# Patient Record
Sex: MALE | Race: WHITE | NOT HISPANIC OR LATINO | URBAN - METROPOLITAN AREA
[De-identification: names, ages, dates, MRNs, and addresses within clinical notes are randomized per-mention and may not be internally consistent; named-entity substitution may affect disease eponyms.]

---

## 2019-09-11 ENCOUNTER — INPATIENT (INPATIENT)
Facility: HOSPITAL | Age: 79
LOS: 7 days | Discharge: NURSING, REHAB OR LONG-TERM CARE | DRG: 698 | End: 2019-09-19
Attending: INTERNAL MEDICINE | Admitting: INTERNAL MEDICINE
Payer: MEDICARE

## 2019-09-11 VITALS
RESPIRATION RATE: 20 BRPM | DIASTOLIC BLOOD PRESSURE: 60 MMHG | OXYGEN SATURATION: 90 % | WEIGHT: 119.93 LBS | SYSTOLIC BLOOD PRESSURE: 98 MMHG | TEMPERATURE: 98 F | HEART RATE: 117 BPM

## 2019-09-11 DIAGNOSIS — A41.9 SEPSIS, UNSPECIFIED ORGANISM: ICD-10-CM

## 2019-09-11 LAB
ALBUMIN SERPL ELPH-MCNC: 3.2 G/DL — LOW (ref 3.3–5)
ALP SERPL-CCNC: 289 U/L — HIGH (ref 40–120)
ALT FLD-CCNC: 29 U/L — SIGNIFICANT CHANGE UP (ref 12–78)
ANION GAP SERPL CALC-SCNC: 9 MMOL/L — SIGNIFICANT CHANGE UP (ref 5–17)
APPEARANCE UR: ABNORMAL
APTT BLD: 33 SEC — SIGNIFICANT CHANGE UP (ref 28.5–37)
AST SERPL-CCNC: 19 U/L — SIGNIFICANT CHANGE UP (ref 15–37)
BASOPHILS # BLD AUTO: 0 K/UL — SIGNIFICANT CHANGE UP (ref 0–0.2)
BASOPHILS NFR BLD AUTO: 0 % — SIGNIFICANT CHANGE UP (ref 0–2)
BILIRUB SERPL-MCNC: 1.7 MG/DL — HIGH (ref 0.2–1.2)
BILIRUB UR-MCNC: ABNORMAL
BUN SERPL-MCNC: 32 MG/DL — HIGH (ref 7–23)
CALCIUM SERPL-MCNC: 8.7 MG/DL — SIGNIFICANT CHANGE UP (ref 8.5–10.1)
CHLORIDE SERPL-SCNC: 105 MMOL/L — SIGNIFICANT CHANGE UP (ref 96–108)
CO2 SERPL-SCNC: 28 MMOL/L — SIGNIFICANT CHANGE UP (ref 22–31)
COLOR SPEC: ABNORMAL
CREAT SERPL-MCNC: 1.1 MG/DL — SIGNIFICANT CHANGE UP (ref 0.5–1.3)
DIFF PNL FLD: ABNORMAL
EOSINOPHIL # BLD AUTO: 0 K/UL — SIGNIFICANT CHANGE UP (ref 0–0.5)
EOSINOPHIL NFR BLD AUTO: 0 % — SIGNIFICANT CHANGE UP (ref 0–6)
GLUCOSE SERPL-MCNC: 103 MG/DL — HIGH (ref 70–99)
GLUCOSE UR QL: NEGATIVE — SIGNIFICANT CHANGE UP
HCT VFR BLD CALC: 41.1 % — SIGNIFICANT CHANGE UP (ref 39–50)
HGB BLD-MCNC: 13.3 G/DL — SIGNIFICANT CHANGE UP (ref 13–17)
INR BLD: 1.51 RATIO — HIGH (ref 0.88–1.16)
KETONES UR-MCNC: ABNORMAL
LACTATE SERPL-SCNC: 4.1 MMOL/L — CRITICAL HIGH (ref 0.7–2)
LACTATE SERPL-SCNC: 7.4 MMOL/L — CRITICAL HIGH (ref 0.7–2)
LEUKOCYTE ESTERASE UR-ACNC: ABNORMAL
LYMPHOCYTES # BLD AUTO: 0.16 K/UL — LOW (ref 1–3.3)
LYMPHOCYTES # BLD AUTO: 7 % — LOW (ref 13–44)
MCHC RBC-ENTMCNC: 30.9 PG — SIGNIFICANT CHANGE UP (ref 27–34)
MCHC RBC-ENTMCNC: 32.4 GM/DL — SIGNIFICANT CHANGE UP (ref 32–36)
MCV RBC AUTO: 95.6 FL — SIGNIFICANT CHANGE UP (ref 80–100)
MONOCYTES # BLD AUTO: 0 K/UL — SIGNIFICANT CHANGE UP (ref 0–0.9)
MONOCYTES NFR BLD AUTO: 0 % — LOW (ref 2–14)
NEUTROPHILS # BLD AUTO: 2.12 K/UL — SIGNIFICANT CHANGE UP (ref 1.8–7.4)
NEUTROPHILS NFR BLD AUTO: 87 % — HIGH (ref 43–77)
NITRITE UR-MCNC: NEGATIVE — SIGNIFICANT CHANGE UP
NRBC # BLD: SIGNIFICANT CHANGE UP /100 WBCS (ref 0–0)
PH UR: 8 — SIGNIFICANT CHANGE UP (ref 5–8)
PLATELET # BLD AUTO: 181 K/UL — SIGNIFICANT CHANGE UP (ref 150–400)
POTASSIUM SERPL-MCNC: 4 MMOL/L — SIGNIFICANT CHANGE UP (ref 3.5–5.3)
POTASSIUM SERPL-SCNC: 4 MMOL/L — SIGNIFICANT CHANGE UP (ref 3.5–5.3)
PROT SERPL-MCNC: 6.3 G/DL — SIGNIFICANT CHANGE UP (ref 6–8.3)
PROT UR-MCNC: 150 MG/DL
PROTHROM AB SERPL-ACNC: 17.3 SEC — HIGH (ref 10–12.9)
RBC # BLD: 4.3 M/UL — SIGNIFICANT CHANGE UP (ref 4.2–5.8)
RBC # FLD: 13.2 % — SIGNIFICANT CHANGE UP (ref 10.3–14.5)
SODIUM SERPL-SCNC: 142 MMOL/L — SIGNIFICANT CHANGE UP (ref 135–145)
SP GR SPEC: 1.01 — SIGNIFICANT CHANGE UP (ref 1.01–1.02)
UROBILINOGEN FLD QL: 8
WBC # BLD: 2.28 K/UL — LOW (ref 3.8–10.5)
WBC # FLD AUTO: 2.28 K/UL — LOW (ref 3.8–10.5)

## 2019-09-11 PROCEDURE — 99291 CRITICAL CARE FIRST HOUR: CPT

## 2019-09-11 PROCEDURE — 74176 CT ABD & PELVIS W/O CONTRAST: CPT | Mod: 26

## 2019-09-11 PROCEDURE — 71045 X-RAY EXAM CHEST 1 VIEW: CPT | Mod: 26

## 2019-09-11 PROCEDURE — 70450 CT HEAD/BRAIN W/O DYE: CPT | Mod: 26

## 2019-09-11 PROCEDURE — 93010 ELECTROCARDIOGRAM REPORT: CPT

## 2019-09-11 PROCEDURE — 71250 CT THORAX DX C-: CPT | Mod: 26

## 2019-09-11 RX ORDER — SODIUM CHLORIDE 9 MG/ML
1000 INJECTION INTRAMUSCULAR; INTRAVENOUS; SUBCUTANEOUS ONCE
Refills: 0 | Status: COMPLETED | OUTPATIENT
Start: 2019-09-11 | End: 2019-09-11

## 2019-09-11 RX ORDER — PIPERACILLIN AND TAZOBACTAM 4; .5 G/20ML; G/20ML
3.38 INJECTION, POWDER, LYOPHILIZED, FOR SOLUTION INTRAVENOUS EVERY 8 HOURS
Refills: 0 | Status: DISCONTINUED | OUTPATIENT
Start: 2019-09-11 | End: 2019-09-19

## 2019-09-11 RX ORDER — VANCOMYCIN HCL 1 G
1000 VIAL (EA) INTRAVENOUS ONCE
Refills: 0 | Status: COMPLETED | OUTPATIENT
Start: 2019-09-11 | End: 2019-09-11

## 2019-09-11 RX ORDER — ACETAMINOPHEN 500 MG
975 TABLET ORAL ONCE
Refills: 0 | Status: COMPLETED | OUTPATIENT
Start: 2019-09-11 | End: 2019-09-11

## 2019-09-11 RX ORDER — SODIUM CHLORIDE 9 MG/ML
1000 INJECTION, SOLUTION INTRAVENOUS
Refills: 0 | Status: DISCONTINUED | OUTPATIENT
Start: 2019-09-11 | End: 2019-09-12

## 2019-09-11 RX ORDER — PIPERACILLIN AND TAZOBACTAM 4; .5 G/20ML; G/20ML
3.38 INJECTION, POWDER, LYOPHILIZED, FOR SOLUTION INTRAVENOUS ONCE
Refills: 0 | Status: COMPLETED | OUTPATIENT
Start: 2019-09-11 | End: 2019-09-11

## 2019-09-11 RX ORDER — ACETAMINOPHEN 500 MG
975 TABLET ORAL ONCE
Refills: 0 | Status: DISCONTINUED | OUTPATIENT
Start: 2019-09-11 | End: 2019-09-11

## 2019-09-11 RX ORDER — ACETAMINOPHEN 500 MG
650 TABLET ORAL EVERY 6 HOURS
Refills: 0 | Status: DISCONTINUED | OUTPATIENT
Start: 2019-09-11 | End: 2019-09-19

## 2019-09-11 RX ADMIN — SODIUM CHLORIDE 1000 MILLILITER(S): 9 INJECTION INTRAMUSCULAR; INTRAVENOUS; SUBCUTANEOUS at 20:40

## 2019-09-11 RX ADMIN — Medication 975 MILLIGRAM(S): at 20:20

## 2019-09-11 RX ADMIN — SODIUM CHLORIDE 1000 MILLILITER(S): 9 INJECTION INTRAMUSCULAR; INTRAVENOUS; SUBCUTANEOUS at 19:40

## 2019-09-11 RX ADMIN — PIPERACILLIN AND TAZOBACTAM 200 GRAM(S): 4; .5 INJECTION, POWDER, LYOPHILIZED, FOR SOLUTION INTRAVENOUS at 21:00

## 2019-09-11 RX ADMIN — SODIUM CHLORIDE 1000 MILLILITER(S): 9 INJECTION INTRAMUSCULAR; INTRAVENOUS; SUBCUTANEOUS at 22:59

## 2019-09-11 RX ADMIN — Medication 975 MILLIGRAM(S): at 19:50

## 2019-09-11 RX ADMIN — SODIUM CHLORIDE 1000 MILLILITER(S): 9 INJECTION INTRAMUSCULAR; INTRAVENOUS; SUBCUTANEOUS at 21:40

## 2019-09-11 RX ADMIN — PIPERACILLIN AND TAZOBACTAM 3.38 GRAM(S): 4; .5 INJECTION, POWDER, LYOPHILIZED, FOR SOLUTION INTRAVENOUS at 21:30

## 2019-09-11 RX ADMIN — Medication 250 MILLIGRAM(S): at 21:59

## 2019-09-11 RX ADMIN — SODIUM CHLORIDE 1000 MILLILITER(S): 9 INJECTION INTRAMUSCULAR; INTRAVENOUS; SUBCUTANEOUS at 21:59

## 2019-09-11 NOTE — ED ADULT NURSE NOTE - OBJECTIVE STATEMENT
Pt presents to the Ed via ambulance from [facility] s/p fever, hematuria in a leg bag. Pt is nonverbal and has a history of Parkinson disease. Pt placed on cardiac monitor. Pt has blood from the penis

## 2019-09-11 NOTE — ED PROVIDER NOTE - OBJECTIVE STATEMENT
79 yo male hx of bph, dementia, parkinsons, sent from [facility] for hematuria, found to have 106 rectal temp, patient nonverbal.  Unable to obtain further history from pain. No family at bedside. 79 yo male hx of bph, dementia, parkinsons, sent from [facility] for hematuria, found to have 106 rectal temp, patient nonverbal.  Unable to obtain further history from pain. No family at bedside.  Has MOLST is DNR/DNI

## 2019-09-11 NOTE — ED ADULT NURSE NOTE - PMH
Benign prostatic hyperplasia with lower urinary tract symptoms, symptom details unspecified    Dementia due to Parkinson's disease with behavioral disturbance    Hypertension, unspecified type    Other hyperlipidemia    Parkinson disease

## 2019-09-11 NOTE — ED ADULT NURSE NOTE - NSIMPLEMENTINTERV_GEN_ALL_ED
Implemented All Fall Risk Interventions:  [place] to call system. Call bell, personal items and telephone within reach. Instruct patient to call for assistance. Room bathroom lighting operational. Non-slip footwear when patient is off stretcher. Physically safe environment: no spills, clutter or unnecessary equipment. Stretcher in lowest position, wheels locked, appropriate side rails in place. Provide visual cue, wrist band, yellow gown, etc. Monitor gait and stability. Monitor for mental status changes and reorient to person, place, and time. Review medications for side effects contributing to fall risk. Reinforce activity limits and safety measures with patient and family.

## 2019-09-11 NOTE — ED PROVIDER NOTE - CRITICAL CARE PROVIDED
conducted a detailed discussion of DNR status/additional history taking/consultation with other physicians/direct patient care (not related to procedure)/interpretation of diagnostic studies

## 2019-09-11 NOTE — ED PROVIDER NOTE - CARE PLAN
Principal Discharge DX:	Sepsis, due to unspecified organism  Secondary Diagnosis:	Fever 106 degrees F or over

## 2019-09-11 NOTE — ED PROVIDER NOTE - PHYSICAL EXAMINATION
Gen: demented, parkinsonian features noted  Head/eyes: airway patent  ENT: mucous membranes dry, airway patent  Neck: supple, no tenderness/meningismus/JVD, Trachea midline  Pulm/lung: Bilateral clear BS, normal resp effort, no wheeze/stridor/retractions  CV/heart: +tachycardia, no M/R/G, +2 dist pulses (radial, pedal DP/PT, popliteal)  GI/Abd: soft, NT/ND, +BS, no guarding/rebound tenderness  Musculoskeletal: no edema/erythema/cyanosis, FROM in all extremities, no C/T/L spine ttp  Skin: +tactile fever  Neuro: demented moving all extremities

## 2019-09-12 DIAGNOSIS — G20 PARKINSON'S DISEASE: ICD-10-CM

## 2019-09-12 DIAGNOSIS — N40.1 BENIGN PROSTATIC HYPERPLASIA WITH LOWER URINARY TRACT SYMPTOMS: ICD-10-CM

## 2019-09-12 DIAGNOSIS — A41.9 SEPSIS, UNSPECIFIED ORGANISM: ICD-10-CM

## 2019-09-12 DIAGNOSIS — I10 ESSENTIAL (PRIMARY) HYPERTENSION: ICD-10-CM

## 2019-09-12 LAB
-  K. PNEUMONIAE GROUP: SIGNIFICANT CHANGE UP
ANION GAP SERPL CALC-SCNC: 10 MMOL/L — SIGNIFICANT CHANGE UP (ref 5–17)
BASOPHILS # BLD AUTO: 0 K/UL — SIGNIFICANT CHANGE UP (ref 0–0.2)
BASOPHILS NFR BLD AUTO: 0 % — SIGNIFICANT CHANGE UP (ref 0–2)
BUN SERPL-MCNC: 29 MG/DL — HIGH (ref 7–23)
CALCIUM SERPL-MCNC: 7.5 MG/DL — LOW (ref 8.5–10.1)
CHLORIDE SERPL-SCNC: 110 MMOL/L — HIGH (ref 96–108)
CO2 SERPL-SCNC: 24 MMOL/L — SIGNIFICANT CHANGE UP (ref 22–31)
CREAT SERPL-MCNC: 1 MG/DL — SIGNIFICANT CHANGE UP (ref 0.5–1.3)
ENTEROCOC DNA BLD POS QL NAA+NON-PROBE: SIGNIFICANT CHANGE UP
EOSINOPHIL # BLD AUTO: 0 K/UL — SIGNIFICANT CHANGE UP (ref 0–0.5)
EOSINOPHIL NFR BLD AUTO: 0 % — SIGNIFICANT CHANGE UP (ref 0–6)
GLUCOSE SERPL-MCNC: 83 MG/DL — SIGNIFICANT CHANGE UP (ref 70–99)
GRAM STN FLD: SIGNIFICANT CHANGE UP
HCT VFR BLD CALC: 31.6 % — LOW (ref 39–50)
HGB BLD-MCNC: 10.4 G/DL — LOW (ref 13–17)
LYMPHOCYTES # BLD AUTO: 0.66 K/UL — LOW (ref 1–3.3)
LYMPHOCYTES # BLD AUTO: 4 % — LOW (ref 13–44)
MANUAL SMEAR VERIFICATION: SIGNIFICANT CHANGE UP
MCHC RBC-ENTMCNC: 31.3 PG — SIGNIFICANT CHANGE UP (ref 27–34)
MCHC RBC-ENTMCNC: 32.9 GM/DL — SIGNIFICANT CHANGE UP (ref 32–36)
MCV RBC AUTO: 95.2 FL — SIGNIFICANT CHANGE UP (ref 80–100)
METHOD TYPE: SIGNIFICANT CHANGE UP
MICROCYTES BLD QL: SLIGHT — SIGNIFICANT CHANGE UP
MONOCYTES # BLD AUTO: 0.82 K/UL — SIGNIFICANT CHANGE UP (ref 0–0.9)
MONOCYTES NFR BLD AUTO: 5 % — SIGNIFICANT CHANGE UP (ref 2–14)
NEUTROPHILS # BLD AUTO: 14.94 K/UL — HIGH (ref 1.8–7.4)
NEUTROPHILS NFR BLD AUTO: 64 % — SIGNIFICANT CHANGE UP (ref 43–77)
NEUTS BAND # BLD: 27 % — HIGH (ref 0–8)
NRBC # BLD: 0 — SIGNIFICANT CHANGE UP
NRBC # BLD: SIGNIFICANT CHANGE UP /100 WBCS (ref 0–0)
PLAT MORPH BLD: NORMAL — SIGNIFICANT CHANGE UP
PLATELET # BLD AUTO: 103 K/UL — LOW (ref 150–400)
POIKILOCYTOSIS BLD QL AUTO: SLIGHT — SIGNIFICANT CHANGE UP
POTASSIUM SERPL-MCNC: 3.5 MMOL/L — SIGNIFICANT CHANGE UP (ref 3.5–5.3)
POTASSIUM SERPL-SCNC: 3.5 MMOL/L — SIGNIFICANT CHANGE UP (ref 3.5–5.3)
RBC # BLD: 3.32 M/UL — LOW (ref 4.2–5.8)
RBC # FLD: 13.3 % — SIGNIFICANT CHANGE UP (ref 10.3–14.5)
RBC BLD AUTO: SIGNIFICANT CHANGE UP
SODIUM SERPL-SCNC: 144 MMOL/L — SIGNIFICANT CHANGE UP (ref 135–145)
SPECIMEN SOURCE: SIGNIFICANT CHANGE UP
SPECIMEN SOURCE: SIGNIFICANT CHANGE UP
WBC # BLD: 16.42 K/UL — HIGH (ref 3.8–10.5)
WBC # FLD AUTO: 16.42 K/UL — HIGH (ref 3.8–10.5)

## 2019-09-12 RX ORDER — ENOXAPARIN SODIUM 100 MG/ML
40 INJECTION SUBCUTANEOUS DAILY
Refills: 0 | Status: DISCONTINUED | OUTPATIENT
Start: 2019-09-12 | End: 2019-09-19

## 2019-09-12 RX ORDER — IPRATROPIUM/ALBUTEROL SULFATE 18-103MCG
3 AEROSOL WITH ADAPTER (GRAM) INHALATION THREE TIMES A DAY
Refills: 0 | Status: DISCONTINUED | OUTPATIENT
Start: 2019-09-12 | End: 2019-09-18

## 2019-09-12 RX ORDER — CARBIDOPA AND LEVODOPA 25; 100 MG/1; MG/1
1 TABLET ORAL THREE TIMES A DAY
Refills: 0 | Status: DISCONTINUED | OUTPATIENT
Start: 2019-09-12 | End: 2019-09-19

## 2019-09-12 RX ORDER — SODIUM CHLORIDE 9 MG/ML
1000 INJECTION, SOLUTION INTRAVENOUS
Refills: 0 | Status: DISCONTINUED | OUTPATIENT
Start: 2019-09-12 | End: 2019-09-19

## 2019-09-12 RX ORDER — SODIUM CHLORIDE 9 MG/ML
750 INJECTION, SOLUTION INTRAVENOUS ONCE
Refills: 0 | Status: COMPLETED | OUTPATIENT
Start: 2019-09-12 | End: 2019-09-12

## 2019-09-12 RX ORDER — VANCOMYCIN HCL 1 G
1000 VIAL (EA) INTRAVENOUS EVERY 12 HOURS
Refills: 0 | Status: DISCONTINUED | OUTPATIENT
Start: 2019-09-12 | End: 2019-09-16

## 2019-09-12 RX ADMIN — SODIUM CHLORIDE 750 MILLILITER(S): 9 INJECTION, SOLUTION INTRAVENOUS at 02:24

## 2019-09-12 RX ADMIN — PIPERACILLIN AND TAZOBACTAM 25 GRAM(S): 4; .5 INJECTION, POWDER, LYOPHILIZED, FOR SOLUTION INTRAVENOUS at 05:30

## 2019-09-12 RX ADMIN — PIPERACILLIN AND TAZOBACTAM 25 GRAM(S): 4; .5 INJECTION, POWDER, LYOPHILIZED, FOR SOLUTION INTRAVENOUS at 21:08

## 2019-09-12 RX ADMIN — SODIUM CHLORIDE 80 MILLILITER(S): 9 INJECTION, SOLUTION INTRAVENOUS at 00:50

## 2019-09-12 RX ADMIN — PIPERACILLIN AND TAZOBACTAM 25 GRAM(S): 4; .5 INJECTION, POWDER, LYOPHILIZED, FOR SOLUTION INTRAVENOUS at 14:25

## 2019-09-12 RX ADMIN — Medication 1000 MILLIGRAM(S): at 00:30

## 2019-09-12 RX ADMIN — Medication 250 MILLIGRAM(S): at 18:09

## 2019-09-12 RX ADMIN — ENOXAPARIN SODIUM 40 MILLIGRAM(S): 100 INJECTION SUBCUTANEOUS at 18:09

## 2019-09-12 RX ADMIN — SODIUM CHLORIDE 80 MILLILITER(S): 9 INJECTION, SOLUTION INTRAVENOUS at 14:27

## 2019-09-12 NOTE — PROVIDER CONTACT NOTE (OTHER) - ASSESSMENT
Pt is non-verbal, poor historian, does not following command, breath sounds diminished to auscultation.  Place pt on tele monitoring and continuos pulse Ox.

## 2019-09-12 NOTE — PHYSICAL THERAPY INITIAL EVALUATION ADULT - SPECIFY REASON(S)
Pt is a LTC resident. Pt is unable to follow commands or participate with PT. Pt is not a candidate for PT.

## 2019-09-12 NOTE — H&P ADULT - HISTORY OF PRESENT ILLNESS
patient with parkinsons , dementia , bed ridden ,   with gross hematuria chronic floy , and developed fever , and and increased stiffness  patient has molst with dnr and dni and cmo but do abx if needed

## 2019-09-12 NOTE — H&P ADULT - NSICDXPASTMEDICALHX_GEN_ALL_CORE_FT
PAST MEDICAL HISTORY:  Benign prostatic hyperplasia with lower urinary tract symptoms, symptom details unspecified     Dementia due to Parkinson's disease with behavioral disturbance     Hypertension, unspecified type     Other hyperlipidemia     Parkinson disease

## 2019-09-12 NOTE — SWALLOW BEDSIDE ASSESSMENT ADULT - SWALLOW EVAL: DIAGNOSIS
1. The patient demonstrated a severe oral dysphagia for puree and honey thick liquids presented via tsp marked by significantly reduced oral aperture, reduced stripping of the bolus from the utensil, and absent bolus manipulation and posterior transport in the oral cavity despite maximum prompting. Bolus was observed to remain in the anterior sulcus despite patient eliciting a delayed pharyngeal swallow trigger with reduced hyolaryngeal elevation upon digital palpation. 2. Subsequently, the bolus was manually removed from the patient's oral cavity by this clinician to reduce risk of aspiration.

## 2019-09-12 NOTE — SWALLOW BEDSIDE ASSESSMENT ADULT - SWALLOW EVAL: VELAR ELEVATION
Unable to formally assess oral-motor function given patient's difficulty following low-level verbal/visual directives.

## 2019-09-12 NOTE — CONSULT NOTE ADULT - SUBJECTIVE AND OBJECTIVE BOX
77 yo male hx of bph, dementia, parkinson's non verbal DNR/I sent from SNF  for hematuria, found to have 106 rectal temp, lactate 7 slight elevation of bili and alk phos leukopenia  CT's were done and pending official read.    He received > 30cc/kg IVF  his BP has been ok for > 5 hrs here.  Lactate has decreased to 4.   Cultures sent.  Received vanco/zosyn.      CXR clear but CT shows L base infiltrate  ABD CT P  U/A suggestive of infection as the source here.    He looks scared but not uncomfortable.  His feet are warm, there is no mottling of the skin,  His abd is soft and no sign of tenderness       Patient does  not require ICU level of care at this time.        D/W Dr Garza  E-ICU 79 yo male hx of bph, dementia, parkinson's non verbal DNR/I sent from SNF  for hematuria, found to have 106 rectal temp, lactate 7 (septic shock by definition) slight elevation of bili and alk phos leukopenia  CT's were done and pending official read.    He received > 30cc/kg IVF  his BP has been ok for > 5 hrs here.  Lactate has decreased to 4.   Cultures sent.  Received vanco/zosyn.      CXR clear but CT shows L base infiltrate  ABD CT P  U/A suggestive of infection as the source here.    He looks scared but not uncomfortable.  His feet are warm, there is no mottling of the skin,  His abd is soft and no sign of tenderness       Patient does  not require ICU level of care at this time.    Please call if his blood pressure becomes an issue         D/W Dr Garza  E-ICU

## 2019-09-12 NOTE — PROGRESS NOTE ADULT - SUBJECTIVE AND OBJECTIVE BOX
PCP  Subjective:   in bed , awake , general stiffness    Objective:   Vital Signs Last 24 Hrs  T(C): 37 (19 @ 15:51), Max: 41.2 (19 @ 18:55)  T(F): 98.6 (19 @ 15:51), Max: 106.1 (19 @ 18:55)  HR: 86 (19 @ 16:24) (82 - 135)  BP: 97/56 (19 @ 15:51) (82/56 - 121/59)  BP(mean): --  RR: 16 (19 @ 16:24) (16 - 20)  SpO2: 92% (19 @ 16:24) (84% - 100%)  Daily     Daily Weight in k.1 (12 Sep 2019 02:07)    GENERAL:  wdwn male , awake , non verbal   EYES: open   NECK: general stiffness  CHEST/LUNG: clear  HEART: s1 s2 regular  ABDOMEN:  soft  EXTREMITIES:  no edema   SKIN:  warm   CNS:  advance parkinson's , stiffness, non verbal    Allergies: Allergies    No Known Allergies    Intolerances        Home Medications:  carbidopa-levodopa 10 mg-100 mg oral tablet: 1 tab(s) orally 4 times a day (11 Sep 2019 22:20)  finasteride 5 mg oral tablet: 1 tab(s) orally once a day (11 Sep 2019 22:20)  metoprolol succinate 25 mg oral tablet, extended release: 1 tab(s) orally once a day (11 Sep 2019 22:20)  simvastatin 40 mg oral tablet: 1 tab(s) orally once a day (at bedtime) (11 Sep 2019 22:20)  tamsulosin 0.4 mg oral capsule: 1 cap(s) orally once a day (11 Sep 2019 22:20)  tolterodine 4 mg oral capsule, extended release: 1 cap(s) orally once a day (11 Sep 2019 22:20)    Medications:   acetaminophen  Suppository .. 650 milliGRAM(s) Rectal every 6 hours PRN  ALBUTerol/ipratropium for Nebulization. 3 milliLiter(s) Nebulizer three times a day  carbidopa/levodopa  25/100 1 Tablet(s) Oral three times a day  enoxaparin Injectable 40 milliGRAM(s) SubCutaneous daily  piperacillin/tazobactam IVPB.. 3.375 Gram(s) IV Intermittent every 8 hours  sodium chloride 0.45%. 1000 milliLiter(s) IV Continuous <Continuous>  vancomycin  IVPB 1000 milliGRAM(s) IV Intermittent every 12 hours      LABS:                        10.4   16.42 )-----------( 103      ( 12 Sep 2019 06:53 )             31.6         144  |  110<H>  |  29<H>  ----------------------------<  83  3.5   |  24  |  1.00    Ca    7.5<L>      12 Sep 2019 06:53    TPro  6.3  /  Alb  3.2<L>  /  TBili  1.7<H>  /  DBili  x   /  AST  19  /  ALT  29  /  AlkPhos  289<H>      PT/INR - ( 11 Sep 2019 20:12 )   PT: 17.3 sec;   INR: 1.51 ratio         PTT - ( 11 Sep 2019 20:12 )  PTT:33.0 sec   @ 20:12  INR 1.51    Urinalysis Basic - ( 11 Sep 2019 20:43 )    Color: Red / Appearance: Turbid / S.010 / pH: x  Gluc: x / Ketone: Trace  / Bili: Small / Urobili: 8   Blood: x / Protein: 150 mg/dL / Nitrite: Negative   Leuk Esterase: Moderate / RBC: >50 /HPF / WBC 11-25   Sq Epi: x / Non Sq Epi: Occasional / Bacteria: Many        CAPILLARY BLOOD GLUCOSE              RECENT CULTURES:  Culture Results:   Growth in anaerobic bottle: Gram Positive Cocci in Pairs and Chains and  Gram Negative Rods  Growth in aerobic bottle: Gram Positive Cocci in Pairs and Chains and  Gram Negative Rods ( @ 00:19)  Culture Results:   Growth in aerobic bottle: Gram Positive Cocci in Pairs and Chains  Growth in anaerobic bottle: Gram Positive Cocci in Pairs and Chains and  Gram Negative Rods  "Due to technical problems, Proteus sp. will Not be reported as part of  the BCID panel until further notice" ***Blood Panel PCR results on this  specimen are available  approximately 3 hours after the Gram stain result.***  Gram stain, PCR, and/or culture results may not always  correspond due to difference in methodologies.  ************************************************************  This PCR assay was performed using [facility].  The following targets are tested for: Enterococcus,  vancomycin resistant enterococci, Listeria monocytogenes,  coagulase negative staphylococci, S. aureus,  methicillin resistant S. aureus, Streptococcus agalactiae  (Group B), S. pneumoniae, S. pyogenes (Group A),  Acinetobacter baumannii, Enterobacter cloacae, E. coli,  Klebsiella oxytoca, K. pneumoniae, Proteus sp.,  Serratia marcescens, Haemophilus influenzae,  Neisseria meningitidis, Pseudomonas aeruginosa, Candida  albicans, C. glabrata, C krusei, C parapsilosis,  C. tropicalis and the KPC resistance gene. ( @ 00:19)        Culture - Blood (collected 19 @ 00:19)  Source: .Blood Blood-Peripheral  Gram Stain (19 @ 14:35):    Growth in aerobic bottle: Gram Positive Cocci in Pairs and Chains    Growth in anaerobic bottle: Gram Positive Cocci in Pairs and Chains and    Gram Negative Rods  Preliminary Report (19 @ 14:36):    Growth in aerobic bottle: Gram Positive Cocci in Pairs and Chains    Growth in anaerobic bottle: Gram Positive Cocci in Pairs and Chains and    Gram Negative Rods    "Due to technical problems, Proteus sp. will Not be reported as part of    the BCID panel until further notice" ***Blood Panel PCR results on this    specimen are available    approximately 3 hours after the Gram stain result.***    Gram stain, PCR, and/or culture results may not always    correspond due to difference in methodologies.    ************************************************************    This PCR assay was performed using [facility].    The following targets are tested for: Enterococcus,    vancomycin resistant enterococci, Listeria monocytogenes,    coagulase negative staphylococci, S. aureus,    methicillin resistant S. aureus, Streptococcus agalactiae    (Group B), S. pneumoniae, S. pyogenes (Group A),    Acinetobacter baumannii, Enterobacter cloacae, E. coli,    Klebsiella oxytoca, K. pneumoniae, Proteus sp.,    Serratia marcescens, Haemophilus influenzae,    Neisseria meningitidis, Pseudomonas aeruginosa, Candida    albicans, C. glabrata, C krusei, C parapsilosis,    C. tropicalis and the KPC resistance gene.  Organism: Blood Culture PCR (19 @ 16:09)  Organism: Blood Culture PCR (19 @ 16:09)      -  Enterococcus species: Detec      -  Klebsiella pneumoniae: Detec      Method Type: PCR    Culture - Blood (collected 19 @ 00:19)  Source: .Blood Blood-Peripheral  Gram Stain (19 @ 16:03):    Growth in anaerobic bottle: Gram Positive Cocci in Pairs and Chains and    Gram Negative Rods    Growth in aerobic bottle: Gram Positive Cocci in Pairs and Chains and    Gram Negative Rods  Preliminary Report (19 @ 16:03):    Growth in anaerobic bottle: Gram Positive Cocci in Pairs and Chains and    Gram Negative Rods    Growth in aerobic bottle: Gram Positive Cocci in Pairs and Chains and    Gram Negative Rods

## 2019-09-13 LAB
-  AMIKACIN: SIGNIFICANT CHANGE UP
-  AMPICILLIN/SULBACTAM: SIGNIFICANT CHANGE UP
-  AMPICILLIN: SIGNIFICANT CHANGE UP
-  AZTREONAM: SIGNIFICANT CHANGE UP
-  CEFAZOLIN: SIGNIFICANT CHANGE UP
-  CEFEPIME: SIGNIFICANT CHANGE UP
-  CEFOXITIN: SIGNIFICANT CHANGE UP
-  CEFTRIAXONE: SIGNIFICANT CHANGE UP
-  CIPROFLOXACIN: SIGNIFICANT CHANGE UP
-  GENTAMICIN: SIGNIFICANT CHANGE UP
-  IMIPENEM: SIGNIFICANT CHANGE UP
-  LEVOFLOXACIN: SIGNIFICANT CHANGE UP
-  MEROPENEM: SIGNIFICANT CHANGE UP
-  NITROFURANTOIN: SIGNIFICANT CHANGE UP
-  PIPERACILLIN/TAZOBACTAM: SIGNIFICANT CHANGE UP
-  TIGECYCLINE: SIGNIFICANT CHANGE UP
-  TOBRAMYCIN: SIGNIFICANT CHANGE UP
-  TRIMETHOPRIM/SULFAMETHOXAZOLE: SIGNIFICANT CHANGE UP
ANION GAP SERPL CALC-SCNC: 7 MMOL/L — SIGNIFICANT CHANGE UP (ref 5–17)
BASOPHILS # BLD AUTO: 0 K/UL — SIGNIFICANT CHANGE UP (ref 0–0.2)
BASOPHILS NFR BLD AUTO: 0 % — SIGNIFICANT CHANGE UP (ref 0–2)
BUN SERPL-MCNC: 25 MG/DL — HIGH (ref 7–23)
CALCIUM SERPL-MCNC: 7.5 MG/DL — LOW (ref 8.5–10.1)
CHLORIDE SERPL-SCNC: 105 MMOL/L — SIGNIFICANT CHANGE UP (ref 96–108)
CO2 SERPL-SCNC: 27 MMOL/L — SIGNIFICANT CHANGE UP (ref 22–31)
CREAT SERPL-MCNC: 0.93 MG/DL — SIGNIFICANT CHANGE UP (ref 0.5–1.3)
CULTURE RESULTS: SIGNIFICANT CHANGE UP
EOSINOPHIL # BLD AUTO: 0 K/UL — SIGNIFICANT CHANGE UP (ref 0–0.5)
EOSINOPHIL NFR BLD AUTO: 0 % — SIGNIFICANT CHANGE UP (ref 0–6)
GLUCOSE SERPL-MCNC: 98 MG/DL — SIGNIFICANT CHANGE UP (ref 70–99)
HCT VFR BLD CALC: 29.9 % — LOW (ref 39–50)
HGB BLD-MCNC: 10 G/DL — LOW (ref 13–17)
LYMPHOCYTES # BLD AUTO: 0.96 K/UL — LOW (ref 1–3.3)
LYMPHOCYTES # BLD AUTO: 4 % — LOW (ref 13–44)
MANUAL SMEAR VERIFICATION: SIGNIFICANT CHANGE UP
MCHC RBC-ENTMCNC: 31.4 PG — SIGNIFICANT CHANGE UP (ref 27–34)
MCHC RBC-ENTMCNC: 33.4 GM/DL — SIGNIFICANT CHANGE UP (ref 32–36)
MCV RBC AUTO: 94 FL — SIGNIFICANT CHANGE UP (ref 80–100)
METHOD TYPE: SIGNIFICANT CHANGE UP
MONOCYTES # BLD AUTO: 0.48 K/UL — SIGNIFICANT CHANGE UP (ref 0–0.9)
MONOCYTES NFR BLD AUTO: 2 % — SIGNIFICANT CHANGE UP (ref 2–14)
NEUTROPHILS # BLD AUTO: 22.31 K/UL — HIGH (ref 1.8–7.4)
NEUTROPHILS NFR BLD AUTO: 81 % — HIGH (ref 43–77)
NEUTS BAND # BLD: 12 % — HIGH (ref 0–8)
NRBC # BLD: 0 — SIGNIFICANT CHANGE UP
NRBC # BLD: SIGNIFICANT CHANGE UP /100 WBCS (ref 0–0)
ORGANISM # SPEC MICROSCOPIC CNT: SIGNIFICANT CHANGE UP
ORGANISM # SPEC MICROSCOPIC CNT: SIGNIFICANT CHANGE UP
PLAT MORPH BLD: NORMAL — SIGNIFICANT CHANGE UP
PLATELET # BLD AUTO: 102 K/UL — LOW (ref 150–400)
POTASSIUM SERPL-MCNC: 3.1 MMOL/L — LOW (ref 3.5–5.3)
POTASSIUM SERPL-SCNC: 3.1 MMOL/L — LOW (ref 3.5–5.3)
RBC # BLD: 3.18 M/UL — LOW (ref 4.2–5.8)
RBC # FLD: 13.2 % — SIGNIFICANT CHANGE UP (ref 10.3–14.5)
RBC BLD AUTO: SIGNIFICANT CHANGE UP
SODIUM SERPL-SCNC: 139 MMOL/L — SIGNIFICANT CHANGE UP (ref 135–145)
SPECIMEN SOURCE: SIGNIFICANT CHANGE UP
VANCOMYCIN TROUGH SERPL-MCNC: 15 UG/ML — SIGNIFICANT CHANGE UP (ref 10–20)
VARIANT LYMPHS # BLD: 1 % — SIGNIFICANT CHANGE UP (ref 0–6)
WBC # BLD: 23.99 K/UL — HIGH (ref 3.8–10.5)
WBC # FLD AUTO: 23.99 K/UL — HIGH (ref 3.8–10.5)

## 2019-09-13 RX ORDER — IPRATROPIUM/ALBUTEROL SULFATE 18-103MCG
3 AEROSOL WITH ADAPTER (GRAM) INHALATION ONCE
Refills: 0 | Status: COMPLETED | OUTPATIENT
Start: 2019-09-13 | End: 2019-09-13

## 2019-09-13 RX ADMIN — SODIUM CHLORIDE 50 MILLILITER(S): 9 INJECTION, SOLUTION INTRAVENOUS at 05:06

## 2019-09-13 RX ADMIN — Medication 3 MILLILITER(S): at 07:42

## 2019-09-13 RX ADMIN — ENOXAPARIN SODIUM 40 MILLIGRAM(S): 100 INJECTION SUBCUTANEOUS at 12:24

## 2019-09-13 RX ADMIN — Medication 3 MILLILITER(S): at 19:11

## 2019-09-13 RX ADMIN — Medication 3 MILLILITER(S): at 01:05

## 2019-09-13 RX ADMIN — Medication 3 MILLILITER(S): at 13:12

## 2019-09-13 RX ADMIN — PIPERACILLIN AND TAZOBACTAM 25 GRAM(S): 4; .5 INJECTION, POWDER, LYOPHILIZED, FOR SOLUTION INTRAVENOUS at 05:06

## 2019-09-13 RX ADMIN — PIPERACILLIN AND TAZOBACTAM 25 GRAM(S): 4; .5 INJECTION, POWDER, LYOPHILIZED, FOR SOLUTION INTRAVENOUS at 22:01

## 2019-09-13 RX ADMIN — Medication 250 MILLIGRAM(S): at 19:05

## 2019-09-13 RX ADMIN — Medication 250 MILLIGRAM(S): at 05:06

## 2019-09-13 RX ADMIN — PIPERACILLIN AND TAZOBACTAM 25 GRAM(S): 4; .5 INJECTION, POWDER, LYOPHILIZED, FOR SOLUTION INTRAVENOUS at 15:03

## 2019-09-13 NOTE — GOALS OF CARE CONVERSATION - PERSONAL ADVANCE DIRECTIVE - CONVERSATION DETAILS
spoke to pt sonArmando on phone, reviewed molst form: pt is dnr dni no TF, comfort measures, antibiotics. Dr Chavez updated molst form.  further discussion of plan of care for pt, wants IV antibiotics, aware need for labs drawn while on antibiotics, concerns regarding pt edmondson catheter & cause of infections, pt had edmondson out, had not voided, need for edmondson again.  further plan, inquired if spoke of hospice for pt, son will talk to soc work when visits, pt not eating, pt may meet guidelines for hospice at this time,(pt had been at [facility] & did not think pt could receive hospice at that time.) pt has been at  since 7/19.  PC will follow.

## 2019-09-13 NOTE — PROGRESS NOTE ADULT - SUBJECTIVE AND OBJECTIVE BOX
Neurology follow up note    KAITLIN [id number]      Interval History:    Patient feels ok no new complaints.    MEDICATIONS    acetaminophen  Suppository .. 650 milliGRAM(s) Rectal every 6 hours PRN  ALBUTerol/ipratropium for Nebulization. 3 milliLiter(s) Nebulizer three times a day  carbidopa/levodopa  25/100 1 Tablet(s) Oral three times a day  enoxaparin Injectable 40 milliGRAM(s) SubCutaneous daily  piperacillin/tazobactam IVPB.. 3.375 Gram(s) IV Intermittent every 8 hours  sodium chloride 0.45%. 1000 milliLiter(s) IV Continuous <Continuous>  vancomycin  IVPB 1000 milliGRAM(s) IV Intermittent every 12 hours      Allergies    No Known Allergies    Intolerances            Vital Signs Last 24 Hrs  T(C): 36.7 (13 Sep 2019 08:00), Max: 37 (12 Sep 2019 15:51)  T(F): 98.1 (13 Sep 2019 08:00), Max: 98.6 (12 Sep 2019 15:51)  HR: 75 (13 Sep 2019 08:00) (75 - 91)  BP: 105/58 (13 Sep 2019 08:00) (97/56 - 109/63)  BP(mean): --  RR: 16 (13 Sep 2019 08:00) (16 - 18)  SpO2: 100% (13 Sep 2019 08:00) (84% - 100%)    REVIEW OF SYSTEMS:  Could not be obtained from the patient secondary to the patient being nonverbal.  Presents to the hospital secondary to history of fevers.    PHYSICAL EXAMINATION:     HEENT:  Head:  Normocephalic and atraumatic.    Eyes:  No scleral icterus.    Ears:  Hard to evaluate secondary to the patient is nonverbal.    NECK:  He had increased tone.    CARDIOVASCULAR:  S1 and S2 heard.    RESPIRATORY:  Decreased breath sounds bilaterally, but gives poor effort.    ABDOMEN:  Soft and nontender.    EXTREMITIES:  No clubbing or cyanosis were noted.      NEUROLOGIC:  The patient was awake in bed.  Positive on and off blink to visual threat.  The patient will not follow any commands.  Unable to gaze extraocular movements, but positive blink to bilateral visual threat.    The patient has positive masked face.    The patient is nonverbal.    Motor:  Elevated bilateral upper extremities.  The patient was able to maintain elevated position, so would say 4/5.    Positive resting tremors were noted.    Positive cogwheel rigidity was noted.    Positive bradykinesia was noted in the upper extremities.    Bilateral lower extremities had significant tone.  Slight flexation at the hip and knee.  As per my conversation with son, it appears that the patient is mostly wheelchair bound.              LABS:  CBC Full  -  ( 13 Sep 2019 07:42 )  WBC Count : 23.99 K/uL  RBC Count : 3.18 M/uL  Hemoglobin : 10.0 g/dL  Hematocrit : 29.9 %  Platelet Count - Automated : 102 K/uL  Mean Cell Volume : 94.0 fl  Mean Cell Hemoglobin : 31.4 pg  Mean Cell Hemoglobin Concentration : 33.4 gm/dL  Auto Neutrophil # : 22.31 K/uL  Auto Lymphocyte # : 0.96 K/uL  Auto Monocyte # : 0.48 K/uL  Auto Eosinophil # : 0.00 K/uL  Auto Basophil # : 0.00 K/uL  Auto Neutrophil % : 81.0 %  Auto Lymphocyte % : 4.0 %  Auto Monocyte % : 2.0 %  Auto Eosinophil % : 0.0 %  Auto Basophil % : 0.0 %    Urinalysis Basic - ( 11 Sep 2019 20:43 )    Color: Red / Appearance: Turbid / S.010 / pH: x  Gluc: x / Ketone: Trace  / Bili: Small / Urobili: 8   Blood: x / Protein: 150 mg/dL / Nitrite: Negative   Leuk Esterase: Moderate / RBC: >50 /HPF / WBC    Sq Epi: x / Non Sq Epi: Occasional / Bacteria: Many          139  |  105  |  25<H>  ----------------------------<  98  3.1<L>   |  27  |  0.93    Ca    7.5<L>      13 Sep 2019 07:42    TPro  6.3  /  Alb  3.2<L>  /  TBili  1.7<H>  /  DBili  x   /  AST  19  /  ALT  29  /  AlkPhos  289<H>      Hemoglobin A1C:     LIVER FUNCTIONS - ( 11 Sep 2019 20:12 )  Alb: 3.2 g/dL / Pro: 6.3 g/dL / ALK PHOS: 289 U/L / ALT: 29 U/L / AST: 19 U/L / GGT: x           Vitamin B12   PT/INR - ( 11 Sep 2019 20:12 )   PT: 17.3 sec;   INR: 1.51 ratio         PTT - ( 11 Sep 2019 20:12 )  PTT:33.0 sec      RADIOLOGY      ANALYSIS AND PLAN:  A 78-year-old with history of dementia and Parkinson's.  1.	For history of dementia, most likely this is very advanced.  I do not feel that adding medications will be of any benefit for the patient.  2.	For history of underlying Parkinson's, suspect the patient does have Parkinson's exacerbation, which is multifactorial secondary to underlying infectious type process and dehydration.  For now, I will continue the patient on his current dose of Sinemet.  Once the patient's general medical issues are rectified, if the patient still appears to have significant symptoms of Parkinson's, I would recommend consideration of increasing the patient's Parkinson's medications to 37.5 mg three times a day and adjust accordingly.  3.	I would recommend fall precautions.  4.	Physical therapy evaluation.  5.	Monitor oral intake.  6.	I spoke with son, Armando at [phone].  He understands my reasoning and thought process.  7.	Greater than 45 minutes of time was spent on the patient, plan of care, reviewing data, speaking to family and multidisciplinary healthcare team.    Thank you for the courtesy of consultation.    Physical therapy evaluation as tolerated  OOB to chair/ambulation with assistance only if possible.

## 2019-09-13 NOTE — CHART NOTE - NSCHARTNOTEFT_GEN_A_CORE
Called by RN for o2 desat. Pt seen and examined at bedside, nonverbal with hx of advanced Parkinson, dementia admitted for sepsis 2/2 uti and pna. Frail elderly with very poor respiratory drive, nonverbal baseline, decreased bs b/l bases. Impression: Acute hypoxic respiratory failure 2/2 pna with copious secretions and concurrent comorbidities of parkinson's and baseline malnutrition likely contributing to his poor respiratory drive.  Start nonrebreather mask with 100% fio2, titrate down as possible to venti mask, maintain o2 sat > 88%. Stat dose of duoneb treatment. Continue suction as needed.  If respiratory function continues to worsen, pt will need bipap but given extensive secretions is a poor candidate.  At high risk for acute decompensation, pt is DNR/DNI, recommend pursuing comfort measures. Called by RN for o2 desat. Pt seen and examined at bedside, nonverbal with hx of advanced Parkinson, dementia admitted for sepsis 2/2 uti and pna. Frail elderly with very poor respiratory drive, nonverbal baseline, decreased bs b/l bases. Labs/vitals/notes reviewed. Impression: Acute hypoxic respiratory failure 2/2 pna with copious secretions and concurrent comorbidities of parkinson's and baseline malnutrition likely contributing to his poor respiratory drive.  Start nonrebreather mask with 100% fio2, titrate down as possible to venti mask, maintain o2 sat > 88%. Stat dose of duoneb treatment. Continue suction as needed.  If respiratory function continues to worsen, pt will need bipap but given extensive secretions is a poor candidate.  At high risk for acute decompensation, pt is DNR/DNI, recommend pursuing comfort measures. Called by RN for o2 desat. Pt seen and examined at bedside, nonverbal with hx of advanced Parkinson and dementia admitted for sepsis 2/2 uti and pna. Frail elderly with very poor respiratory drive, nonverbal baseline, decreased bs b/l bases. Labs/vitals/notes reviewed. Impression: Acute hypoxic respiratory failure 2/2 pna with copious secretions and concurrent comorbidities of parkinson's and baseline malnutrition likely contributing to his poor respiratory drive.  Start nonrebreather mask with 100% fio2, titrate down as possible to venti mask, maintain o2 sat > 88%. Stat dose of duoneb treatment. Continue suction as needed.  If respiratory function continues to worsen, pt will need bipap but given extensive secretions is a poor candidate.  At high risk for acute decompensation, pt is DNR/DNI, recommend pursuing comfort measures. Called by RN for o2 desat. Pt seen and examined at bedside, nonverbal with hx of advanced Parkinson and dementia admitted for sepsis 2/2 uti and pna. Frail elderly with very poor respiratory drive, nonverbal baseline, decreased bs b/l bases. Labs/vitals/notes reviewed. Impression: Acute hypoxic respiratory failure 2/2 pna with copious secretions and concurrent comorbidities of parkinson's and baseline malnutrition likely contributing to his poor respiratory drive.  Start nonrebreather mask with 100% fio2, titrate down as possible to venti mask, maintain o2 sat > 88%. Stat dose of duoneb treatment. Continue suction as needed.  If respiratory function continues to worsen, pt will need bipap but given extensive secretions is a poor candidate.  At high risk for acute decompensation, pt is DNR/DNI.

## 2019-09-13 NOTE — PROGRESS NOTE ADULT - SUBJECTIVE AND OBJECTIVE BOX
PCP  Subjective:   in bed , asleep , lethargic ,     Objective:   Vital Signs Last 24 Hrs  T(C): 36.7 (19 @ 08:00), Max: 37 (19 @ 15:51)  T(F): 98.1 (19 @ 08:00), Max: 98.6 (19 @ 15:51)  HR: 75 (19 @ 08:00) (75 - 91)  BP: 105/58 (19 @ 08:00) (97/56 - 109/63)  BP(mean): --  RR: 16 (19 @ 08:00) (16 - 18)  SpO2: 100% (19 @ 08:00) (84% - 100%)  Daily     Daily       GENERAL:  wdwn male , awake , non verbal   EYES: open   NECK: general stiffness  CHEST/LUNG: clear  HEART: s1 s2 regular  ABDOMEN:  soft  EXTREMITIES:  no edema   SKIN:  warm   CNS:  advance parkinson's , stiffness, non verbal    Allergies: Allergies    No Known Allergies    Intolerances        Home Medications:  carbidopa-levodopa 10 mg-100 mg oral tablet: 1 tab(s) orally 4 times a day (11 Sep 2019 22:20)  finasteride 5 mg oral tablet: 1 tab(s) orally once a day (11 Sep 2019 22:20)  metoprolol succinate 25 mg oral tablet, extended release: 1 tab(s) orally once a day (11 Sep 2019 22:20)  simvastatin 40 mg oral tablet: 1 tab(s) orally once a day (at bedtime) (11 Sep 2019 22:20)  tamsulosin 0.4 mg oral capsule: 1 cap(s) orally once a day (11 Sep 2019 22:20)  tolterodine 4 mg oral capsule, extended release: 1 cap(s) orally once a day (11 Sep 2019 22:20)    Medications:   acetaminophen  Suppository .. 650 milliGRAM(s) Rectal every 6 hours PRN  ALBUTerol/ipratropium for Nebulization. 3 milliLiter(s) Nebulizer three times a day  carbidopa/levodopa  25/100 1 Tablet(s) Oral three times a day  enoxaparin Injectable 40 milliGRAM(s) SubCutaneous daily  piperacillin/tazobactam IVPB.. 3.375 Gram(s) IV Intermittent every 8 hours  sodium chloride 0.45%. 1000 milliLiter(s) IV Continuous <Continuous>  vancomycin  IVPB 1000 milliGRAM(s) IV Intermittent every 12 hours      LABS:                        10.0   23.99 )-----------( 102      ( 13 Sep 2019 07:42 )             29.9         139  |  105  |  25<H>  ----------------------------<  98  3.1<L>   |  27  |  0.93    Ca    7.5<L>      13 Sep 2019 07:42    TPro  6.3  /  Alb  3.2<L>  /  TBili  1.7<H>  /  DBili  x   /  AST  19  /  ALT  29  /  AlkPhos  289<H>      PT/INR - ( 11 Sep 2019 20:12 )   PT: 17.3 sec;   INR: 1.51 ratio         PTT - ( 11 Sep 2019 20:12 )  PTT:33.0 sec   @ 20:12  INR 1.51    Urinalysis Basic - ( 11 Sep 2019 20:43 )    Color: Red / Appearance: Turbid / S.010 / pH: x  Gluc: x / Ketone: Trace  / Bili: Small / Urobili: 8   Blood: x / Protein: 150 mg/dL / Nitrite: Negative   Leuk Esterase: Moderate / RBC: >50 /HPF / WBC 11-25   Sq Epi: x / Non Sq Epi: Occasional / Bacteria: Many        CAPILLARY BLOOD GLUCOSE              RECENT CULTURES:  Culture Results:   Growth in anaerobic bottle: Gram Positive Cocci in Pairs and Chains and  Gram Negative Rods  Growth in aerobic bottle: Gram Positive Cocci in Pairs and Chains and  Gram Negative Rods ( @ 00:19)  Culture Results:   Growth in aerobic bottle: Gram Positive Cocci in Pairs and Chains  Growth in anaerobic bottle: Gram Positive Cocci in Pairs and Chains and  Gram Negative Rods  "Due to technical problems, Proteus sp. will Not be reported as part of  the BCID panel until further notice" ***Blood Panel PCR results on this  specimen are available  approximately 3 hours after the Gram stain result.***  Gram stain, PCR, and/or culture results may not always  correspond due to difference in methodologies.  ************************************************************  This PCR assay was performed using [email or web address].  The following targets are tested for: Enterococcus,  vancomycin resistant enterococci, Listeria monocytogenes,  coagulase negative staphylococci, S. aureus,  methicillin resistant S. aureus, Streptococcus agalactiae  (Group B), S. pneumoniae, S. pyogenes (Group A),  Acinetobacter baumannii, Enterobacter cloacae, E. coli,  Klebsiella oxytoca, K. pneumoniae, Proteus sp.,  Serratia marcescens, Haemophilus influenzae,  Neisseria meningitidis, Pseudomonas aeruginosa, Candida  albicans, C. glabrata, C krusei, C parapsilosis,  C. tropicalis and the KPC resistance gene. ( @ 00:19)  Culture Results:   >100,000 CFU/ml Gram Negative Rods ( @ 00:10)        Culture - Blood (collected 19 @ 00:19)  Source: .Blood Blood-Peripheral  Gram Stain (19 @ 14:35):    Growth in aerobic bottle: Gram Positive Cocci in Pairs and Chains    Growth in anaerobic bottle: Gram Positive Cocci in Pairs and Chains and    Gram Negative Rods  Preliminary Report (19 @ 14:36):    Growth in aerobic bottle: Gram Positive Cocci in Pairs and Chains    Growth in anaerobic bottle: Gram Positive Cocci in Pairs and Chains and    Gram Negative Rods    "Due to technical problems, Proteus sp. will Not be reported as part of    the BCID panel until further notice" ***Blood Panel PCR results on this    specimen are available    approximately 3 hours after the Gram stain result.***    Gram stain, PCR, and/or culture results may not always    correspond due to difference in methodologies.    ************************************************************    This PCR assay was performed using [email or web address].    The following targets are tested for: Enterococcus,    vancomycin resistant enterococci, Listeria monocytogenes,    coagulase negative staphylococci, S. aureus,    methicillin resistant S. aureus, Streptococcus agalactiae    (Group B), S. pneumoniae, S. pyogenes (Group A),    Acinetobacter baumannii, Enterobacter cloacae, E. coli,    Klebsiella oxytoca, K. pneumoniae, Proteus sp.,    Serratia marcescens, Haemophilus influenzae,    Neisseria meningitidis, Pseudomonas aeruginosa, Candida    albicans, C. glabrata, C krusei, C parapsilosis,    C. tropicalis and the KPC resistance gene.  Organism: Blood Culture PCR (19 @ 16:09)  Organism: Blood Culture PCR (19 @ 16:09)      -  Enterococcus species: Detec      -  Klebsiella pneumoniae: Detec      Method Type: PCR    Culture - Blood (collected 19 @ 00:19)  Source: .Blood Blood-Peripheral  Gram Stain (19 @ 16:03):    Growth in anaerobic bottle: Gram Positive Cocci in Pairs and Chains and    Gram Negative Rods    Growth in aerobic bottle: Gram Positive Cocci in Pairs and Chains and    Gram Negative Rods  Preliminary Report (19 @ 16:03):    Growth in anaerobic bottle: Gram Positive Cocci in Pairs and Chains and    Gram Negative Rods    Growth in aerobic bottle: Gram Positive Cocci in Pairs and Chains and    Gram Negative Rods    Culture - Urine (collected 19 @ 00:10)  Source: .Urine Clean Catch (Midstream)  Preliminary Report (19 @ 00:52):    >100,000 CFU/ml Gram Negative Rods

## 2019-09-13 NOTE — CONSULT NOTE ADULT - SUBJECTIVE AND OBJECTIVE BOX
Full note to follow   Patient a nonhistorian, does not respond to questions or commands  Polymicrobial septicemia  Likely urinary origin  Continue Vanco Zosyn  Dose of Vanco generous, await pending trough  Hopefully will be able to narrow coverage  F/U repeat blood cx  Await sensi of isolates  Thank you for the courtesy of this referral.  Jv Sorensen MD  [phone]  ------------------------------------  [facility], Division of Infectious Diseases  Bettina Sorensen, CHADWICK Hernández, AUGUSTUS HERRERACHAKA, KAITLIN  78y, Male  [id number]    HPI--      PMH/PSH--  Benign prostatic hyperplasia with lower urinary tract symptoms, symptom details unspecified  Other hyperlipidemia  Hypertension, unspecified type  Dementia due to Parkinson's disease with behavioral disturbance  Parkinson disease      Allergies--      Medications--  Antibiotics: piperacillin/tazobactam IVPB.. 3.375 Gram(s) IV Intermittent every 8 hours  vancomycin  IVPB 1000 milliGRAM(s) IV Intermittent every 12 hours    Immunologic:   Other: acetaminophen  Suppository .. PRN  ALBUTerol/ipratropium for Nebulization.  carbidopa/levodopa  25/100  enoxaparin Injectable  sodium chloride 0.45%.      Social History--  EtOH: denies ***  Tobacco: denies ***  Drug Use: denies ***    Family/Marital History--    Remainder not relevant to clinical concern.    Travel/Environmental/Occupational History:  *** insert T/E/O Hx ***    Review of Systems:  A >=10-point review of systems was obtained.     Pertinent positives and negatives--  Constitutional: No fevers. No Chills. No Rigors.   Eyes:  ENMT:  Cardiovascular: No chest pain. No palpitations.  Respiratory: No shortness of breath. No cough.  Gastrointestinal: No nausea or vomiting. No diarrhea or constipation.   Genitourinary:  Musculoskeletal:  Skin:  Neurologic:  Psychiatric: Pleasant. Appropriate affect.  Endocrine:  Heme/Lymphatic:  Allergy/Immunologic:    Review of systems otherwise negative except as previously noted.    Physical Exam--  Vital Signs: T(F): 97.7 (09-13-19 @ 12:00), Max: 98.6 (09-13-19 @ 04:40)  HR: 75 (09-13-19 @ 13:22)  BP: 110/66 (09-13-19 @ 12:00)  RR: 16 (09-13-19 @ 12:00)  SpO2: 99% (09-13-19 @ 12:00)  Wt(kg): --  General: Nontoxic-appearing Male in no acute distress.  HEENT: AT/NC. PERRL. EOMI. Anicteric. Conjunctiva pink and moist. Oropharynx clear. Dentition fair.  Neck: Not rigid. No sense of mass.  Nodes: None palpable.  Lungs: Clear bilaterally without rales, wheezing or rhonchi  Heart: Regular rate and rhythm. No Murmur. No rub. No gallop. No palpable thrill.  Abdomen: Bowel sounds present and normoactive. Soft. Nondistended. Nontender. No sense of mass. No organomegaly.  Back: No spinal tenderness. No costovertebral angle tenderness.   Extremities: No cyanosis or clubbing. No edema.   Skin: Warm. Dry. Good turgor. No rash. No vasculitic stigmata.  Psychiatric: Appropriate affect and mood for situation.         Laboratory & Imaging Data--  CBC                        10.0   23.99 )-----------( 102      ( 13 Sep 2019 07:42 )             29.9       Chemistries  09-13    139  |  105  |  25<H>  ----------------------------<  98  3.1<L>   |  27  |  0.93    Ca    7.5<L>      13 Sep 2019 07:42    TPro  6.3  /  Alb  3.2<L>  /  TBili  1.7<H>  /  DBili  x   /  AST  19  /  ALT  29  /  AlkPhos  289<H>  09-11    Urinalysis (09.11.19 @ 20:43)    Glucose Qualitative, Urine: Negative    Blood, Urine: Large    pH Urine: 8.0    Color: Red    Urine Appearance: Turbid    Bilirubin: Small    Ketone - Urine: Trace    Specific Gravity: 1.010    Protein, Urine: 150 mg/dL    Urobilinogen: 8    Nitrite: Negative    Leukocyte Esterase Concentration: Moderate  Urine Microscopic-Add On (NC) (09.11.19 @ 20:43)    Red Blood Cell - Urine: >50 /HPF    White Blood Cell - Urine: 11-25    Bacteria: Many    Epithelial Cells: Occasional        Culture Data    Culture - Blood (collected 12 Sep 2019 00:19)  Source: .Blood Blood-Peripheral  Gram Stain (12 Sep 2019 14:35):    Growth in aerobic bottle: Gram Positive Cocci in Pairs and Chains    Growth in anaerobic bottle: Gram Positive Cocci in Pairs and Chains and    Gram Negative Rods  Preliminary Report (12 Sep 2019 14:36):    Growth in aerobic bottle: Gram Positive Cocci in Pairs and Chains    Growth in anaerobic bottle: Gram Positive Cocci in Pairs and Chains and    Gram Negative Rods    "Due to technical problems, Proteus sp. will Not be reported as part of    the BCID panel until further notice" ***Blood Panel PCR results on this    specimen are available    approximately 3 hours after the Gram stain result.***    Gram stain, PCR, and/or culture results may not always    correspond due to difference in methodologies.    ************************************************************    This PCR assay was performed using [employer].    The following targets are tested for: Enterococcus,    vancomycin resistant enterococci, Listeria monocytogenes,    coagulase negative staphylococci, S. aureus,    methicillin resistant S. aureus, Streptococcus agalactiae    (Group B), S. pneumoniae, S. pyogenes (Group A),    Acinetobacter baumannii, Enterobacter cloacae, E. coli,    Klebsiella oxytoca, K. pneumoniae, Proteus sp.,    Serratia marcescens, Haemophilus influenzae,    Neisseria meningitidis, Pseudomonas aeruginosa, Candida    albicans, C. glabrata, C krusei, C parapsilosis,    C. tropicalis and the KPC resistance gene.  Organism: Blood Culture PCR (12 Sep 2019 16:09)  Organism: Blood Culture PCR (12 Sep 2019 16:09)    Culture - Blood (collected 12 Sep 2019 00:19)  Source: .Blood Blood-Peripheral  Gram Stain (12 Sep 2019 16:03):    Growth in anaerobic bottle: Gram Positive Cocci in Pairs and Chains and    Gram Negative Rods    Growth in aerobic bottle: Gram Positive Cocci in Pairs and Chains and    Gram Negative Rods  Preliminary Report (12 Sep 2019 16:03):    Growth in anaerobic bottle: Gram Positive Cocci in Pairs and Chains and    Gram Negative Rods    Growth in aerobic bottle: Gram Positive Cocci in Pairs and Chains and    Gram Negative Rods    Culture - Urine (collected 12 Sep 2019 00:10)  Source: .Urine Clean Catch (Midstream)  Preliminary Report (13 Sep 2019 00:52):    >100,000 CFU/ml Gram Negative Rods    < from: CT Abdomen and Pelvis No Cont (09.11.19 @ 22:35) >    EXAM:  CT ABDOMEN AND PELVIS                          EXAM:  CT CHEST                            PROCEDURE DATE:  09/11/2019          INTERPRETATION:  [facility] RADIOLOGIST PRELIMINARY REPORT - [facility] Radiologist   Dr. Jason Jackson    EXAM:   CT Chest Without Contrast     EXAM DATE/TIME:   9/11/2019 10:03 PM     CLINICAL HISTORY:   78 years old, male; Patient HX: Fever; Hematuria. HX of parkinson disease     TECHNIQUE:   Imaging protocol: Computed tomography of the chest without contrast.     COMPARISON:   No relevant prior studies available.     FINDINGS:     Evaluation of the heart, vascular structures, and intra-abdominal organs   is limited without the administration of IV contrast.    Lungs: Mild to moderate bibasilar airspace disease and/or atelectasis,   worse on the left. Patchy airspace disease right middle lobe.   Pleural space: Mild pleural thickening right lung apex with a possible   8mm pleural based nodular density.   Heart: Unremarkable. No cardiomegaly. No pericardial effusion.   Aorta: Aorta demonstrates mild atherosclerotic calcification.   Lymph nodes: Unremarkable. No enlarged lymph nodes.   Bones/joints: Unremarkable. No acute fracture.   Soft tissues: Unremarkable.     IMPRESSION:     1. Mild pleural thickening right lung apex with a 8mm pleural based   nodular density.     2. Mild to moderate bibasilar airspace disease and/or atelectasis, worse   on the left. Patchy airspace disease right middle lobe.       =========================  EXAM:   CT Abdomen and Pelvis Without Contrast     EXAM DATE/TIME:   9/11/2019 10:03 PM     CLINICAL HISTORY:   78 years old, male; Patient HX: Ever; Hematuria. HX of parkinson disease     TECHNIQUE:   Imaging protocol: Computed tomography of the abdomen and pelvis without   contrast.   3D rendering: MIP reconstructed images were created and reviewed.     COMPARISON:   No relevant prior studies available.     FINDINGS:     Liver: Normal. No mass.   Gallbladder and bile ducts: Gallbladder surgically absent.   Pancreas: Normal. No ductal dilation.   Spleen: Normal. No splenomegaly.   Adrenals: Normal. No mass.   Kidneys and ureters: Focal right renal hypodensity that cannot be further   characterized on the current noncontrast examination. No hydronephrosis.   No ureteral stones. Mild bilateral perinephric edematous change, left   greater than right.   No hydronephrosis. An inflammatory process such as pyelonephritis is a   consideration. Correlate clinically.   Stomach and bowel: Above average stool throughout the colon.Previous   right colectomy. Borderline mild nonspecific colitis.   Appendix: No evidence of appendicitis.   Intraperitoneal space: Unremarkable. No free air. No significant fluid   collection.   Vasculature: Aorta demonstrates mild atherosclerotic calcification.   Lymph nodes: Unremarkable. No enlarged lymph nodes.     Bladder: Bladder decompressed by a Gatica catheter. Tiny layering stones   within the right side of the bladder. Small amount of intraluminal air   consistent with instrumentation.   Reproductive: Hypertrophy of the prostate.   Bones/joints: Nonspecific area of sclerosis involving the left side of   the vertebral body and pedicle at L4. No acute fracture.   Soft tissues: Unremarkable.     IMPRESSION:     1. Mild bilateral perinephric edematous change, left greater than right.   No hydronephrosis. An inflammatory process such as pyelonephritis is a   consideration. Correlate clinically.     2. Previous right colectomy. Borderline mild nonspecific colitis.      AGREE WITH THE ABOVE FINDINGS AND REPORT  JV RICHARDSON M.D., ATTENDING RADIOLOGIST  This document has been electronically signed. Sep 12 2019  8:33AM                < end of copied text > Full note to follow   Patient a nonhistorian, does not respond to questions or commands  Polymicrobial septicemia  Likely urinary origin  Continue Vanco Zosyn  Dose of Vanco generous, await pending trough  Hopefully will be able to narrow coverage  F/U repeat blood cx  Await sensi of isolates  Thank you for the courtesy of this referral.  Jv Sorensen MD  [phone]  ------------------------------------  [facility], Division of Infectious Diseases  Bettina Sorensen, CHADWICK Hernández, AUGUSTUS LUX, [facility]  78y, Male  [id number]    HPI--  78M, makes eye contact but not verbal/interactive, and does does not follow commands.  Per admit H&P  patient with parkinsons , dementia , bed ridden ,   with gross hematuria chronic floy , and developed fever , and and increased stiffness  patient has molst with dnr and dni and cmo but do abx if needed (12 Sep 2019 14:49)    Per ED note  · HPI Objective Statement: 79 yo male hx of bph, dementia, parkinsons, sent from [facility] for hematuria, found to have 106 rectal temp, patient nonverbal.  Unable to obtain further history from pain. No family at bedside.  Has MOLST is DNR/DNI	    Patient now known to have polymicrobial septicemia.    PMH/PSH--  Benign prostatic hyperplasia with lower urinary tract symptoms, symptom details unspecified  Other hyperlipidemia  Hypertension, unspecified type  Dementia due to Parkinson's disease with behavioral disturbance  Parkinson disease      Allergies-- NKDA      Medications--  Antibiotics: piperacillin/tazobactam IVPB.. 3.375 Gram(s) IV Intermittent every 8 hours  vancomycin  IVPB 1000 milliGRAM(s) IV Intermittent every 12 hours    Immunologic:   Other: acetaminophen  Suppository .. PRN  ALBUTerol/ipratropium for Nebulization.  carbidopa/levodopa  25/100  enoxaparin Injectable  sodium chloride 0.45%.      Social History--  EtOH: none known.  Tobacco: none known.  Drug use: none known.     Family/Marital History--  Unable      Review of Systems:  Review of systems unable due to dementia.     Physical Exam--  Vital Signs: T(F): 97.7 (09-13-19 @ 12:00), Max: 98.6 (09-13-19 @ 04:40)  HR: 75 (09-13-19 @ 13:22)  BP: 110/66 (09-13-19 @ 12:00)  RR: 16 (09-13-19 @ 12:00)  SpO2: 99% (09-13-19 @ 12:00)  Wt(kg): --  General: Frail, nontoxic-appearing Male in no acute distress.  HEENT: PArkinsonian facies. Bitemporal wasting..eyeu Oropharynx/dentition unable secondary to patient compliance. Anicteric. Conjunctiva pink and moist.   Neck: Increased tone not frankly rigid. No sense of mass.  Nodes: None palpable.  Lungs: Poor effort grossly clear bilaterally without rales, wheezing or rhonchi  Heart: Regular rate and rhythm. No Murmur. No rub. No gallop. No palpable thrill.  Abdomen: Bowel sounds present and normoactive. Soft. Nondistended. Nontender. No sense of mass. No organomegaly.  Back: No spinal tenderness. No costovertebral angle tenderness.   Extremities: No cyanosis or clubbing. No edema. Wasted.  Skin: Warm. Dry. Good turgor. No rash. No vasculitic stigmata.  Psychiatric: Appropriate affect and mood for situation.         Laboratory & Imaging Data--  CBC                        10.0   23.99 )-----------( 102      ( 13 Sep 2019 07:42 )             29.9       Chemistries  09-13    139  |  105  |  25<H>  ----------------------------<  98  3.1<L>   |  27  |  0.93    Ca    7.5<L>      13 Sep 2019 07:42    TPro  6.3  /  Alb  3.2<L>  /  TBili  1.7<H>  /  DBili  x   /  AST  19  /  ALT  29  /  AlkPhos  289<H>  09-11    Urinalysis (09.11.19 @ 20:43)    Glucose Qualitative, Urine: Negative    Blood, Urine: Large    pH Urine: 8.0    Color: Red    Urine Appearance: Turbid    Bilirubin: Small    Ketone - Urine: Trace    Specific Gravity: 1.010    Protein, Urine: 150 mg/dL    Urobilinogen: 8    Nitrite: Negative    Leukocyte Esterase Concentration: Moderate  Urine Microscopic-Add On (NC) (09.11.19 @ 20:43)    Red Blood Cell - Urine: >50 /HPF    White Blood Cell - Urine: 11-25    Bacteria: Many    Epithelial Cells: Occasional    Culture Data    Culture - Blood (collected 12 Sep 2019 00:19)  Source: .Blood Blood-Peripheral  Gram Stain (12 Sep 2019 14:35):    Growth in aerobic bottle: Gram Positive Cocci in Pairs and Chains    Growth in anaerobic bottle: Gram Positive Cocci in Pairs and Chains and    Gram Negative Rods  Preliminary Report (12 Sep 2019 14:36):    Growth in aerobic bottle: Gram Positive Cocci in Pairs and Chains    Growth in anaerobic bottle: Gram Positive Cocci in Pairs and Chains and    Gram Negative Rods    "Due to technical problems, Proteus sp. will Not be reported as part of    the BCID panel until further notice" ***Blood Panel PCR results on this    specimen are available    approximately 3 hours after the Gram stain result.***    Gram stain, PCR, and/or culture results may not always    correspond due to difference in methodologies.    ************************************************************    This PCR assay was performed using [employer].    The following targets are tested for: Enterococcus,    vancomycin resistant enterococci, Listeria monocytogenes,    coagulase negative staphylococci, S. aureus,    methicillin resistant S. aureus, Streptococcus agalactiae    (Group B), S. pneumoniae, S. pyogenes (Group A),    Acinetobacter baumannii, Enterobacter cloacae, E. coli,    Klebsiella oxytoca, K. pneumoniae, Proteus sp.,    Serratia marcescens, Haemophilus influenzae,    Neisseria meningitidis, Pseudomonas aeruginosa, Candida    albicans, C. glabrata, C krusei, C parapsilosis,    C. tropicalis and the KPC resistance gene.  Organism: Blood Culture PCR (12 Sep 2019 16:09)  Organism: Blood Culture PCR (12 Sep 2019 16:09)    Culture - Blood (collected 12 Sep 2019 00:19)  Source: .Blood Blood-Peripheral  Gram Stain (12 Sep 2019 16:03):    Growth in anaerobic bottle: Gram Positive Cocci in Pairs and Chains and    Gram Negative Rods    Growth in aerobic bottle: Gram Positive Cocci in Pairs and Chains and    Gram Negative Rods  Preliminary Report (12 Sep 2019 16:03):    Growth in anaerobic bottle: Gram Positive Cocci in Pairs and Chains and    Gram Negative Rods    Growth in aerobic bottle: Gram Positive Cocci in Pairs and Chains and    Gram Negative Rods    Culture - Urine (collected 12 Sep 2019 00:10)  Source: .Urine Clean Catch (Midstream)  Preliminary Report (13 Sep 2019 00:52):    >100,000 CFU/ml Gram Negative Rods    < from: CT Abdomen and Pelvis No Cont (09.11.19 @ 22:35) >    EXAM:  CT ABDOMEN AND PELVIS                          EXAM:  CT CHEST                            PROCEDURE DATE:  09/11/2019          INTERPRETATION:  [facility] RADIOLOGIST PRELIMINARY REPORT - [facility] Radiologist   Dr. Jason Jackson    EXAM:   CT Chest Without Contrast     EXAM DATE/TIME:   9/11/2019 10:03 PM     CLINICAL HISTORY:   78 years old, male; Patient HX: Fever; Hematuria. HX of parkinson disease     TECHNIQUE:   Imaging protocol: Computed tomography of the chest without contrast.     COMPARISON:   No relevant prior studies available.     FINDINGS:     Evaluation of the heart, vascular structures, and intra-abdominal organs   is limited without the administration of IV contrast.    Lungs: Mild to moderate bibasilar airspace disease and/or atelectasis,   worse on the left. Patchy airspace disease right middle lobe.   Pleural space: Mild pleural thickening right lung apex with a possible   8mm pleural based nodular density.   Heart: Unremarkable. No cardiomegaly. No pericardial effusion.   Aorta: Aorta demonstrates mild atherosclerotic calcification.   Lymph nodes: Unremarkable. No enlarged lymph nodes.   Bones/joints: Unremarkable. No acute fracture.   Soft tissues: Unremarkable.     IMPRESSION:     1. Mild pleural thickening right lung apex with a 8mm pleural based   nodular density.     2. Mild to moderate bibasilar airspace disease and/or atelectasis, worse   on the left. Patchy airspace disease right middle lobe.       =========================  EXAM:   CT Abdomen and Pelvis Without Contrast     EXAM DATE/TIME:   9/11/2019 10:03 PM     CLINICAL HISTORY:   78 years old, male; Patient HX: Ever; Hematuria. HX of parkinson disease     TECHNIQUE:   Imaging protocol: Computed tomography of the abdomen and pelvis without   contrast.   3D rendering: MIP reconstructed images were created and reviewed.     COMPARISON:   No relevant prior studies available.     FINDINGS:     Liver: Normal. No mass.   Gallbladder and bile ducts: Gallbladder surgically absent.   Pancreas: Normal. No ductal dilation.   Spleen: Normal. No splenomegaly.   Adrenals: Normal. No mass.   Kidneys and ureters: Focal right renal hypodensity that cannot be further   characterized on the current noncontrast examination. No hydronephrosis.   No ureteral stones. Mild bilateral perinephric edematous change, left   greater than right.   No hydronephrosis. An inflammatory process such as pyelonephritis is a   consideration. Correlate clinically.   Stomach and bowel: Above average stool throughout the colon.Previous   right colectomy. Borderline mild nonspecific colitis.   Appendix: No evidence of appendicitis.   Intraperitoneal space: Unremarkable. No free air. No significant fluid   collection.   Vasculature: Aorta demonstrates mild atherosclerotic calcification.   Lymph nodes: Unremarkable. No enlarged lymph nodes.     Bladder: Bladder decompressed by a Gatica catheter. Tiny layering stones   within the right side of the bladder. Small amount of intraluminal air   consistent with instrumentation.   Reproductive: Hypertrophy of the prostate.   Bones/joints: Nonspecific area of sclerosis involving the left side of   the vertebral body and pedicle at L4. No acute fracture.   Soft tissues: Unremarkable.     IMPRESSION:     1. Mild bilateral perinephric edematous change, left greater than right.   No hydronephrosis. An inflammatory process such as pyelonephritis is a   consideration. Correlate clinically.     2. Previous right colectomy. Borderline mild nonspecific colitis.      AGREE WITH THE ABOVE FINDINGS AND REPORT  JV RICHARDSON M.D., ATTENDING RADIOLOGIST  This document has been electronically signed. Sep 12 2019  8:33AM                < end of copied text >

## 2019-09-13 NOTE — CONSULT NOTE ADULT - ASSESSMENT
Polymicrobial septicemia, likely urinary origin given pyuria, and potential inflammatory changes on abdominopelvic portion of CT  No clinical concern of pneumonia. Certainly at risk for aaspiration, and may well accoint for changes on chest portion of CT, but exam is very unimpressive. I would expect patient to be much sicker, with much more evident clinical and radiographic findings with septiccemia due to a pulmonary focus (e.g. lung abscess given polymicrobial growth).  No concern of skin/soft tissue infection  No concern of primary CNS process  Abdominal exam is benign, CT "borderline mild nonspecific colitis" again does not seem to be enough to account for polymicrobial septicemia. Hepatobiliary tree without obstruction.  Low WBC at presentation not a favorable prognostic indicator.    Suggestions  Continue Vanco Zosyn  Dose of Vanco generous, await pending trough  Hopefully will be able to narrow coverage  F/U repeat blood cx  Await sensi of isolates    Thank you for the courtesy of this referral.  Jv Sorensen MD  [phone]

## 2019-09-13 NOTE — CONSULT NOTE ADULT - SUBJECTIVE AND OBJECTIVE BOX
CHIEF COMPLAINT:  78y Male with chief complaint of hematuria and high fever  sent from [facility]         HISTORY OF PRESENT ILLNESS:    patient with parkinsons , dementia , bed ridden ,   with gross hematuria chronic edmondson  , and developed fever , and and increased stiffness  patient has molst with dnr and dni and cmo but do abx if needed  edmondson changed in ER     *************************************************************************************************  PAST MEDICAL & SURGICAL HISTORY:  Benign prostatic hyperplasia with lower urinary tract symptoms, symptom details unspecified  Other hyperlipidemia  Hypertension, unspecified type  Dementia due to Parkinson's disease with behavioral disturbance  Parkinson disease      MEDICATIONS  (STANDING):  ALBUTerol/ipratropium for Nebulization. 3 milliLiter(s) Nebulizer three times a day  carbidopa/levodopa  25/100 1 Tablet(s) Oral three times a day  enoxaparin Injectable 40 milliGRAM(s) SubCutaneous daily  piperacillin/tazobactam IVPB.. 3.375 Gram(s) IV Intermittent every 8 hours  sodium chloride 0.45%. 1000 milliLiter(s) (50 mL/Hr) IV Continuous <Continuous>  vancomycin  IVPB 1000 milliGRAM(s) IV Intermittent every 12 hours    MEDICATIONS  (PRN):  acetaminophen  Suppository .. 650 milliGRAM(s) Rectal every 6 hours PRN Temp greater or equal to 38.5C (101.3F)      ALLERGIES:  No Known Allergies      SOCIAL HISTORY:          ETOH:                                  Smoking:                                   Drugs:                                         Occupation:    FAMILY HISTORY:      CONSTITUTIONAL: No weakness, fevers or chills    EYES/ENT: No visual changes;  No vertigo or throat pain     NECK: No pain or stiffness    RESPIRATORY: No cough, wheezing, hemoptysis; No shortness of breath    CARDIOVASCULAR: No chest pain or palpitations    GASTROINTESTINAL: No abdominal or epigastric pain. No nausea, vomiting, or hematemesis; No diarrhea or constipation. No melena or hematochezia.    GENITOURINARY: chronic edmondson     NEUROLOGICAL: No numbness or weakness    SKIN: No itching, burning, rashes, or lesions     All other review of systems is negative unless indicated above.    ****************************************************************************************************  PHYSICAL EXAM:    Vital Signs Last 24 Hrs  T(C): 36.7 (13 Sep 2019 08:00), Max: 37 (12 Sep 2019 15:51)  T(F): 98.1 (13 Sep 2019 08:00), Max: 98.6 (12 Sep 2019 15:51)  HR: 75 (13 Sep 2019 08:00) (75 - 91)  BP: 105/58 (13 Sep 2019 08:00) (97/56 - 109/63)  BP(mean): --  RR: 16 (13 Sep 2019 08:00) (16 - 18)  SpO2: 100% (13 Sep 2019 08:00) (84% - 100%)    GENERAL: non communicative  has nasal O2    PENIS: circ with 16 fr edmondson  urine clear     TESTES: soft     PROSTATE/ RECTAL: deferred    ABDOMEN: soft     BACK:    LOWER EXTREMITIES: no edema    NEUROLOGICAL: hand shakes from parkinsons       *******************************************************************************************************  LABS:                        10.0   23.99 )-----------( 102      ( 13 Sep 2019 07:42 )             29.9     09-13    139  |  105  |  25<H>  ----------------------------<  98  3.1<L>   |  27  |  0.93    Ca    7.5<L>      13 Sep 2019 07:42    TPro  6.3  /  Alb  3.2<L>  /  TBili  1.7<H>  /  DBili  x   /  AST  19  /  ALT  29  /  AlkPhos  289<H>      PT/INR - ( 11 Sep 2019 20:12 )   PT: 17.3 sec;   INR: 1.51 ratio         PTT - ( 11 Sep 2019 20:12 )  PTT:33.0 sec  Urinalysis Basic - ( 11 Sep 2019 20:43 )    Color: Red / Appearance: Turbid / S.010 / pH: x  Gluc: x / Ketone: Trace  / Bili: Small / Urobili: 8   Blood: x / Protein: 150 mg/dL / Nitrite: Negative   Leuk Esterase: Moderate / RBC: >50 /HPF / WBC 11-25   Sq Epi: x / Non Sq Epi: Occasional / Bacteria: Many      Urine Culture:  @ 00:19  Urine Culure Results   Culture - Urine (19 @ 00:10)    Specimen Source: .Urine Clean Catch (Midstream)    Culture Results:   >100,000 CFU/ml Gram Negative Rods      Growth in anaerobic bottle: Gram Positive Cocci in Pairs and Chains and  Gram Negative Rods  Growth in aerobic bottle: Gram Positive Cocci in Pairs and Chains and  Gram Negative Rods  Organism Blood Culture PCR      RADIOLOGY & ADDITIONAL STUDIES:    EXAM:  CT ABDOMEN AND PELVIS                          EXAM:  CT CHEST                            PROCEDURE DATE:  2019          INTERPRETATION:  AD RADIOLOGIST PRELIMINARY REPORT - AD Radiologist   Dr. Jason Jackson    EXAM:   CT Chest Without Contrast     EXAM DATE/TIME:   2019 10:03 PM     CLINICAL HISTORY:   78 years old, male; Patient HX: Fever; Hematuria. HX of parkinson disease     TECHNIQUE:   Imaging protocol: Computed tomography of the chest without contrast.     COMPARISON:   No relevant prior studies available.     FINDINGS:     Evaluation of the heart, vascular structures, and intra-abdominal organs   is limited without the administration of IV contrast.    Lungs: Mild to moderate bibasilar airspace disease and/or atelectasis,   worse on the left. Patchy airspace disease right middle lobe.   Pleural space: Mild pleural thickening right lung apex with a possible   8mm pleural based nodular density.   Heart: Unremarkable. No cardiomegaly. No pericardial effusion.   Aorta: Aorta demonstrates mild atherosclerotic calcification.   Lymph nodes: Unremarkable. No enlarged lymph nodes.   Bones/joints: Unremarkable. No acute fracture.   Soft tissues: Unremarkable.     IMPRESSION:     1. Mild pleural thickening right lung apex with a 8mm pleural based   nodular density.     2. Mild to moderate bibasilar airspace disease and/or atelectasis, worse   on the left. Patchy airspace disease right middle lobe.       =========================  EXAM:   CT Abdomen and Pelvis Without Contrast     EXAM DATE/TIME:   2019 10:03 PM     CLINICAL HISTORY:   78 years old, male; Patient HX: Ever; Hematuria. HX of parkinson disease     TECHNIQUE:   Imaging protocol: Computed tomography of the abdomen and pelvis without   contrast.   3D rendering: MIP reconstructed images were created and reviewed.     COMPARISON:   No relevant prior studies available.     FINDINGS:     Liver: Normal. No mass.   Gallbladder and bile ducts: Gallbladder surgically absent.   Pancreas: Normal. No ductal dilation.   Spleen: Normal. No splenomegaly.   Adrenals: Normal. No mass.   Kidneys and ureters: Focal right renal hypodensity that cannot be further   characterized on the current noncontrast examination. No hydronephrosis.   No ureteral stones. Mild bilateral perinephric edematous change, left   greater than right.   No hydronephrosis. An inflammatory process such as pyelonephritis is a   consideration. Correlate clinically.   Stomach and bowel: Above average stool throughout the colon.Previous   right colectomy. Borderline mild nonspecific colitis.   Appendix: No evidence of appendicitis.   Intraperitoneal space: Unremarkable. No free air. No significant fluid   collection.   Vasculature: Aorta demonstrates mild atherosclerotic calcification.   Lymph nodes: Unremarkable. No enlarged lymph nodes.     Bladder: Bladder decompressed by a Edmondson catheter. Tiny layering stones   within the right side of the bladder. Small amount of intraluminal air   consistent with instrumentation.   Reproductive: Hypertrophy of the prostate.   Bones/joints: Nonspecific area of sclerosis involving the left side of   the vertebral body and pedicle at L4. No acute fracture.   Soft tissues: Unremarkable.     IMPRESSION:     1. Mild bilateral perinephric edematous change, left greater than right.   No hydronephrosis. An inflammatory process such as pyelonephritis is a   consideration. Correlate clinically.     2. Previous right colectomy. Borderline mild nonspecific colitis.      AGREE WITH THE ABOVE FINDINGS AND REPORT                BÁRBARA RICHARDSON M.D., ATTENDING RADIOLOGIST  This document has been electronically signed. Sep 12 2019  8:33AM            *********************************************************************************************************  IMPRESSION: urosepsis     PLAN: has new edmondson  is on zosyn and vancomycin CHIEF COMPLAINT:  78y Male with chief complaint of hematuria and high fever  sent from [facility]         HISTORY OF PRESENT ILLNESS:    patient with parkinsons , dementia , bed ridden ,   with gross hematuria chronic edmondson  , and developed fever , and and increased stiffness  patient has molst with dnr and dni and cmo but do abx if needed  edmondson changed in ER     *************************************************************************************************  PAST MEDICAL & SURGICAL HISTORY:  Benign prostatic hyperplasia with lower urinary tract symptoms, symptom details unspecified  Other hyperlipidemia  Hypertension, unspecified type  Dementia due to Parkinson's disease with behavioral disturbance  Parkinson disease      MEDICATIONS  (STANDING):  ALBUTerol/ipratropium for Nebulization. 3 milliLiter(s) Nebulizer three times a day  carbidopa/levodopa  25/100 1 Tablet(s) Oral three times a day  enoxaparin Injectable 40 milliGRAM(s) SubCutaneous daily  piperacillin/tazobactam IVPB.. 3.375 Gram(s) IV Intermittent every 8 hours  sodium chloride 0.45%. 1000 milliLiter(s) (50 mL/Hr) IV Continuous <Continuous>  vancomycin  IVPB 1000 milliGRAM(s) IV Intermittent every 12 hours    MEDICATIONS  (PRN):  acetaminophen  Suppository .. 650 milliGRAM(s) Rectal every 6 hours PRN Temp greater or equal to 38.5C (101.3F)      ALLERGIES:  No Known Allergies      SOCIAL HISTORY:          ETOH:                                  Smoking:                                   Drugs:                                         Occupation:    FAMILY HISTORY:      CONSTITUTIONAL: No weakness, fevers or chills    EYES/ENT: No visual changes;  No vertigo or throat pain     NECK: No pain or stiffness    RESPIRATORY: No cough, wheezing, hemoptysis; No shortness of breath    CARDIOVASCULAR: No chest pain or palpitations    GASTROINTESTINAL: No abdominal or epigastric pain. No nausea, vomiting, or hematemesis; No diarrhea or constipation. No melena or hematochezia.    GENITOURINARY: chronic edmondson     NEUROLOGICAL: No numbness or weakness    SKIN: No itching, burning, rashes, or lesions     All other review of systems is negative unless indicated above.    ****************************************************************************************************  PHYSICAL EXAM:    Vital Signs Last 24 Hrs  T(C): 36.7 (13 Sep 2019 08:00), Max: 37 (12 Sep 2019 15:51)  T(F): 98.1 (13 Sep 2019 08:00), Max: 98.6 (12 Sep 2019 15:51)  HR: 75 (13 Sep 2019 08:00) (75 - 91)  BP: 105/58 (13 Sep 2019 08:00) (97/56 - 109/63)  BP(mean): --  RR: 16 (13 Sep 2019 08:00) (16 - 18)  SpO2: 100% (13 Sep 2019 08:00) (84% - 100%)    GENERAL: non communicative  has nasal O2    PENIS: circ with 16 fr edmondson  urine clear     TESTES: soft     PROSTATE/ RECTAL: deferred    ABDOMEN: soft     BACK:    LOWER EXTREMITIES: no edema    NEUROLOGICAL: hand shakes from parkinsons       *******************************************************************************************************  LABS:                        10.0   23.99 )-----------( 102      ( 13 Sep 2019 07:42 )             29.9     09-13    139  |  105  |  25<H>  ----------------------------<  98  3.1<L>   |  27  |  0.93    Ca    7.5<L>      13 Sep 2019 07:42    TPro  6.3  /  Alb  3.2<L>  /  TBili  1.7<H>  /  DBili  x   /  AST  19  /  ALT  29  /  AlkPhos  289<H>      PT/INR - ( 11 Sep 2019 20:12 )   PT: 17.3 sec;   INR: 1.51 ratio         PTT - ( 11 Sep 2019 20:12 )  PTT:33.0 sec  Urinalysis Basic - ( 11 Sep 2019 20:43 )    Color: Red / Appearance: Turbid / S.010 / pH: x  Gluc: x / Ketone: Trace  / Bili: Small / Urobili: 8   Blood: x / Protein: 150 mg/dL / Nitrite: Negative   Leuk Esterase: Moderate / RBC: >50 /HPF / WBC 11-25   Sq Epi: x / Non Sq Epi: Occasional / Bacteria: Many      Urine Culture:  @ 00:19  Urine Culure Results   Culture - Urine (19 @ 00:10)    Specimen Source: .Urine Clean Catch (Midstream)    Culture Results:   >100,000 CFU/ml Gram Negative Rods      Growth in anaerobic bottle: Gram Positive Cocci in Pairs and Chains and  Gram Negative Rods  Growth in aerobic bottle: Gram Positive Cocci in Pairs and Chains and  Gram Negative Rods  Organism Blood Culture PCR      RADIOLOGY & ADDITIONAL STUDIES:    EXAM:  CT ABDOMEN AND PELVIS                          EXAM:  CT CHEST                            PROCEDURE DATE:  2019          INTERPRETATION:  AD RADIOLOGIST PRELIMINARY REPORT - AD Radiologist   Dr. Jason Jackson    EXAM:   CT Chest Without Contrast     EXAM DATE/TIME:   2019 10:03 PM     CLINICAL HISTORY:   78 years old, male; Patient HX: Fever; Hematuria. HX of parkinson disease     TECHNIQUE:   Imaging protocol: Computed tomography of the chest without contrast.     COMPARISON:   No relevant prior studies available.     FINDINGS:     Evaluation of the heart, vascular structures, and intra-abdominal organs   is limited without the administration of IV contrast.    Lungs: Mild to moderate bibasilar airspace disease and/or atelectasis,   worse on the left. Patchy airspace disease right middle lobe.   Pleural space: Mild pleural thickening right lung apex with a possible   8mm pleural based nodular density.   Heart: Unremarkable. No cardiomegaly. No pericardial effusion.   Aorta: Aorta demonstrates mild atherosclerotic calcification.   Lymph nodes: Unremarkable. No enlarged lymph nodes.   Bones/joints: Unremarkable. No acute fracture.   Soft tissues: Unremarkable.     IMPRESSION:     1. Mild pleural thickening right lung apex with a 8mm pleural based   nodular density.     2. Mild to moderate bibasilar airspace disease and/or atelectasis, worse   on the left. Patchy airspace disease right middle lobe.       =========================  EXAM:   CT Abdomen and Pelvis Without Contrast     EXAM DATE/TIME:   2019 10:03 PM     CLINICAL HISTORY:   78 years old, male; Patient HX: Ever; Hematuria. HX of parkinson disease     TECHNIQUE:   Imaging protocol: Computed tomography of the abdomen and pelvis without   contrast.   3D rendering: MIP reconstructed images were created and reviewed.     COMPARISON:   No relevant prior studies available.     FINDINGS:     Liver: Normal. No mass.   Gallbladder and bile ducts: Gallbladder surgically absent.   Pancreas: Normal. No ductal dilation.   Spleen: Normal. No splenomegaly.   Adrenals: Normal. No mass.   Kidneys and ureters: Focal right renal hypodensity that cannot be further   characterized on the current noncontrast examination. No hydronephrosis.   No ureteral stones. Mild bilateral perinephric edematous change, left   greater than right.   No hydronephrosis. An inflammatory process such as pyelonephritis is a   consideration. Correlate clinically.   Stomach and bowel: Above average stool throughout the colon.Previous   right colectomy. Borderline mild nonspecific colitis.   Appendix: No evidence of appendicitis.   Intraperitoneal space: Unremarkable. No free air. No significant fluid   collection.   Vasculature: Aorta demonstrates mild atherosclerotic calcification.   Lymph nodes: Unremarkable. No enlarged lymph nodes.     Bladder: Bladder decompressed by a Edmondson catheter. Tiny layering stones   within the right side of the bladder. Small amount of intraluminal air   consistent with instrumentation.   Reproductive: Hypertrophy of the prostate.   Bones/joints: Nonspecific area of sclerosis involving the left side of   the vertebral body and pedicle at L4. No acute fracture.   Soft tissues: Unremarkable.     IMPRESSION:     1. Mild bilateral perinephric edematous change, left greater than right.   No hydronephrosis. An inflammatory process such as pyelonephritis is a   consideration. Correlate clinically.     2. Previous right colectomy. Borderline mild nonspecific colitis.      AGREE WITH THE ABOVE FINDINGS AND REPORT                BÁRBARA RICHARDSON M.D., ATTENDING RADIOLOGIST  This document has been electronically signed. Sep 12 2019  8:33AM            *********************************************************************************************************  IMPRESSION: urosepsis     PLAN: has new edmondson  is on zosyn and vancomycin   no need for flomax or proscar

## 2019-09-14 DIAGNOSIS — E87.6 HYPOKALEMIA: ICD-10-CM

## 2019-09-14 DIAGNOSIS — A49.9 BACTERIAL INFECTION, UNSPECIFIED: ICD-10-CM

## 2019-09-14 LAB
-  AMIKACIN: SIGNIFICANT CHANGE UP
-  AMPICILLIN/SULBACTAM: SIGNIFICANT CHANGE UP
-  AMPICILLIN: SIGNIFICANT CHANGE UP
-  AMPICILLIN: SIGNIFICANT CHANGE UP
-  AZTREONAM: SIGNIFICANT CHANGE UP
-  CEFAZOLIN: SIGNIFICANT CHANGE UP
-  CEFEPIME: SIGNIFICANT CHANGE UP
-  CEFOXITIN: SIGNIFICANT CHANGE UP
-  CEFTRIAXONE: SIGNIFICANT CHANGE UP
-  CIPROFLOXACIN: SIGNIFICANT CHANGE UP
-  GENTAMICIN SYNERGY: SIGNIFICANT CHANGE UP
-  GENTAMICIN: SIGNIFICANT CHANGE UP
-  IMIPENEM: SIGNIFICANT CHANGE UP
-  LEVOFLOXACIN: SIGNIFICANT CHANGE UP
-  MEROPENEM: SIGNIFICANT CHANGE UP
-  PIPERACILLIN/TAZOBACTAM: SIGNIFICANT CHANGE UP
-  TOBRAMYCIN: SIGNIFICANT CHANGE UP
-  TRIMETHOPRIM/SULFAMETHOXAZOLE: SIGNIFICANT CHANGE UP
-  VANCOMYCIN: SIGNIFICANT CHANGE UP
ANION GAP SERPL CALC-SCNC: 10 MMOL/L — SIGNIFICANT CHANGE UP (ref 5–17)
BASOPHILS # BLD AUTO: 0 K/UL — SIGNIFICANT CHANGE UP (ref 0–0.2)
BASOPHILS NFR BLD AUTO: 0 % — SIGNIFICANT CHANGE UP (ref 0–2)
BUN SERPL-MCNC: 16 MG/DL — SIGNIFICANT CHANGE UP (ref 7–23)
CALCIUM SERPL-MCNC: 7.5 MG/DL — LOW (ref 8.5–10.1)
CHLORIDE SERPL-SCNC: 105 MMOL/L — SIGNIFICANT CHANGE UP (ref 96–108)
CO2 SERPL-SCNC: 25 MMOL/L — SIGNIFICANT CHANGE UP (ref 22–31)
CREAT SERPL-MCNC: 0.66 MG/DL — SIGNIFICANT CHANGE UP (ref 0.5–1.3)
CULTURE RESULTS: SIGNIFICANT CHANGE UP
CULTURE RESULTS: SIGNIFICANT CHANGE UP
EOSINOPHIL # BLD AUTO: 0 K/UL — SIGNIFICANT CHANGE UP (ref 0–0.5)
EOSINOPHIL NFR BLD AUTO: 0 % — SIGNIFICANT CHANGE UP (ref 0–6)
GLUCOSE SERPL-MCNC: 104 MG/DL — HIGH (ref 70–99)
HCT VFR BLD CALC: 29.8 % — LOW (ref 39–50)
HGB BLD-MCNC: 10 G/DL — LOW (ref 13–17)
LYMPHOCYTES # BLD AUTO: 0.69 K/UL — LOW (ref 1–3.3)
LYMPHOCYTES # BLD AUTO: 4 % — LOW (ref 13–44)
MANUAL SMEAR VERIFICATION: SIGNIFICANT CHANGE UP
MCHC RBC-ENTMCNC: 30.9 PG — SIGNIFICANT CHANGE UP (ref 27–34)
MCHC RBC-ENTMCNC: 33.6 GM/DL — SIGNIFICANT CHANGE UP (ref 32–36)
MCV RBC AUTO: 92 FL — SIGNIFICANT CHANGE UP (ref 80–100)
METHOD TYPE: SIGNIFICANT CHANGE UP
METHOD TYPE: SIGNIFICANT CHANGE UP
MONOCYTES # BLD AUTO: 0.34 K/UL — SIGNIFICANT CHANGE UP (ref 0–0.9)
MONOCYTES NFR BLD AUTO: 2 % — SIGNIFICANT CHANGE UP (ref 2–14)
NEUTROPHILS # BLD AUTO: 16.01 K/UL — HIGH (ref 1.8–7.4)
NEUTROPHILS NFR BLD AUTO: 80 % — HIGH (ref 43–77)
NEUTS BAND # BLD: 13 % — HIGH (ref 0–8)
NRBC # BLD: 0 — SIGNIFICANT CHANGE UP
NRBC # BLD: SIGNIFICANT CHANGE UP /100 WBCS (ref 0–0)
ORGANISM # SPEC MICROSCOPIC CNT: SIGNIFICANT CHANGE UP
PLAT MORPH BLD: NORMAL — SIGNIFICANT CHANGE UP
PLATELET # BLD AUTO: 108 K/UL — LOW (ref 150–400)
POTASSIUM SERPL-MCNC: 2.8 MMOL/L — CRITICAL LOW (ref 3.5–5.3)
POTASSIUM SERPL-SCNC: 2.8 MMOL/L — CRITICAL LOW (ref 3.5–5.3)
RBC # BLD: 3.24 M/UL — LOW (ref 4.2–5.8)
RBC # FLD: 12.9 % — SIGNIFICANT CHANGE UP (ref 10.3–14.5)
RBC BLD AUTO: SIGNIFICANT CHANGE UP
SODIUM SERPL-SCNC: 140 MMOL/L — SIGNIFICANT CHANGE UP (ref 135–145)
SPECIMEN SOURCE: SIGNIFICANT CHANGE UP
SPECIMEN SOURCE: SIGNIFICANT CHANGE UP
VARIANT LYMPHS # BLD: 1 % — SIGNIFICANT CHANGE UP (ref 0–6)
WBC # BLD: 17.22 K/UL — HIGH (ref 3.8–10.5)
WBC # FLD AUTO: 17.22 K/UL — HIGH (ref 3.8–10.5)

## 2019-09-14 RX ORDER — POTASSIUM CHLORIDE 20 MEQ
10 PACKET (EA) ORAL
Refills: 0 | Status: COMPLETED | OUTPATIENT
Start: 2019-09-14 | End: 2019-09-14

## 2019-09-14 RX ADMIN — Medication 3 MILLILITER(S): at 19:09

## 2019-09-14 RX ADMIN — Medication 3 MILLILITER(S): at 13:32

## 2019-09-14 RX ADMIN — Medication 3 MILLILITER(S): at 08:03

## 2019-09-14 RX ADMIN — PIPERACILLIN AND TAZOBACTAM 25 GRAM(S): 4; .5 INJECTION, POWDER, LYOPHILIZED, FOR SOLUTION INTRAVENOUS at 14:11

## 2019-09-14 RX ADMIN — Medication 100 MILLIEQUIVALENT(S): at 12:53

## 2019-09-14 RX ADMIN — Medication 100 MILLIEQUIVALENT(S): at 11:40

## 2019-09-14 RX ADMIN — PIPERACILLIN AND TAZOBACTAM 25 GRAM(S): 4; .5 INJECTION, POWDER, LYOPHILIZED, FOR SOLUTION INTRAVENOUS at 05:05

## 2019-09-14 RX ADMIN — PIPERACILLIN AND TAZOBACTAM 25 GRAM(S): 4; .5 INJECTION, POWDER, LYOPHILIZED, FOR SOLUTION INTRAVENOUS at 22:14

## 2019-09-14 RX ADMIN — SODIUM CHLORIDE 50 MILLILITER(S): 9 INJECTION, SOLUTION INTRAVENOUS at 22:59

## 2019-09-14 RX ADMIN — Medication 250 MILLIGRAM(S): at 18:30

## 2019-09-14 RX ADMIN — ENOXAPARIN SODIUM 40 MILLIGRAM(S): 100 INJECTION SUBCUTANEOUS at 11:45

## 2019-09-14 RX ADMIN — Medication 250 MILLIGRAM(S): at 05:05

## 2019-09-14 RX ADMIN — Medication 100 MILLIEQUIVALENT(S): at 14:11

## 2019-09-14 NOTE — PROVIDER CONTACT NOTE (CRITICAL VALUE NOTIFICATION) - ACTION/TREATMENT ORDERED:
K riders to be ordered
NS x 3 liters , Zosyn 3.375 gm IVPB. Vanco 1 GM  IVPB
labs in am
TO START ON VANCOMYCIN IVPB

## 2019-09-14 NOTE — DIETITIAN INITIAL EVALUATION ADULT. - OTHER INFO
As per chart pt is a 78 year old male with a PMH of Parkinson, dementia, bed ridden, admitted with urosespsis. Pt is unable to tolerate anything PO, high risk of aspiration. Per [facility] pt is DNR/DNI, no TF, considering comfort measures, hospice.    Pt non-verbal, spoke to pt's family members present at bedside, however extended family members so they were only able to provide a little subjective information. Per transfer records pt was on honey thick liquids PTA and was receiving Ensure Plus, BID and Ensure Pudding, BID PTA. NKFA.     Pt remains NPO. Pt's current weight per chart (9/14) 114.8lbs, (9/12) 106lbs, (admission wt) 119.15lbs. Pt's family unsure of pt's current or UBW but state that he has always been on the thin side, guess his weight in the past might have been around max of 130lbs, unsure of any weight loss though. Unable to preform Nutrition Focused Physical Exam at this time, not enough information at this to meets criteria for malnutrition, will continue to monitor closely. No GI distress noted at this time, +BM 9/12.    Stage 1 sacrum  No pressure injuries noted at this time

## 2019-09-14 NOTE — DIETITIAN INITIAL EVALUATION ADULT. - FACTORS AFF FOOD INTAKE
Pt seen by SLP on 9/12 recommending that oral nutrition/ hydration is contraindicated at this time, MBS/difficulty swallowing

## 2019-09-14 NOTE — PROGRESS NOTE ADULT - SUBJECTIVE AND OBJECTIVE BOX
pt seen on rounds  remains  lethargic wbc  elevated k  low to give iv kcl   lungs poor effort  cor  regular rate  abdomen soft  extremities no edema

## 2019-09-14 NOTE — DIETITIAN INITIAL EVALUATION ADULT. - ADD RECOMMEND
1) If pt goes on comfort measures/ hospice consider pleasure feeds, 2) If pleasure feeds are warranted recommend to reinstate same oral nutritional supplement pt was receiving PTA  of Ensure BID, Ensure Pudding BID, 3) RD to remain available if further nutrition intervention is warranted and within pt's GOC, 4) Monitor pt's weight, skin, edema, GI distress

## 2019-09-14 NOTE — PROGRESS NOTE ADULT - SUBJECTIVE AND OBJECTIVE BOX
[facility], Division of Infectious Diseases  [place] Trent A. Lee  [phone]  Name: KAITLIN LUX  Age: 78y  Gender: Male  MRN: [id number]    Interval History--  Notes reviewed  awake nonverbal    Past Medical History--  Benign prostatic hyperplasia with lower urinary tract symptoms, symptom details unspecified  Other hyperlipidemia  Hypertension, unspecified type  Dementia due to Parkinson's disease with behavioral disturbance  Parkinson disease      For details regarding the patient's social history, family history, and other miscellaneous elements, please refer the initial infectious diseases consultation and/or the admitting history and physical examination for this admission.    Allergies    No Known Allergies    Intolerances        Medications--  Antibiotics:  piperacillin/tazobactam IVPB.. 3.375 Gram(s) IV Intermittent every 8 hours  vancomycin  IVPB 1000 milliGRAM(s) IV Intermittent every 12 hours    Immunologic:    Other:  acetaminophen  Suppository .. PRN  ALBUTerol/ipratropium for Nebulization.  carbidopa/levodopa  25/100  enoxaparin Injectable  potassium chloride  10 mEq/100 mL IVPB  sodium chloride 0.45%.      Review of Systems--  A 10-point review of systems was obtained.   unable to obtain   Review of systems otherwise negative except as previously noted.    Physical Examination--  Vital Signs: T(F): 98.1 (09-14-19 @ 11:44), Max: 98.3 (09-13-19 @ 17:30)  HR: 61 (09-14-19 @ 13:33)  BP: 111/66 (09-14-19 @ 11:44)  RR: 18 (09-14-19 @ 11:44)  SpO2: 99% (09-14-19 @ 13:33)  Wt(kg): --  General: Nontoxic-appearing Male in no acute distress. + tremors  HEENT: AT/NC. +NC  Neck: Not rigid. No sense of mass.  Nodes: None palpable.  Lungs: noncomopliant with exam  Heart: Regular rate and rhythm. No Murmur.   Abdomen: Bowel sounds present and normoactive. Soft. Nondistended.   Extremities: No cyanosis or clubbing. No edema.   Skin: Warm. Dry. Good turgor. No rash. No vasculitic stigmata.  Psychiatric:nonverbal        Laboratory Studies--  CBC                        10.0   17.22 )-----------( 108      ( 14 Sep 2019 07:50 )             29.8       Chemistries  09-14    140  |  105  |  16  ----------------------------<  104<H>  2.8<LL>   |  25  |  0.66    Ca    7.5<L>      14 Sep 2019 07:50        Culture Data    Culture - Blood (collected 12 Sep 2019 00:19)  Source: .Blood Blood-Peripheral  Gram Stain (12 Sep 2019 14:35):    Growth in aerobic bottle: Gram Positive Cocci in Pairs and Chains    Growth in anaerobic bottle: Gram Positive Cocci in Pairs and Chains and    Gram Negative Rods  Preliminary Report (13 Sep 2019 23:24):    Growth in aerobic and anaerobic bottles: Enterococcus faecalis    Susceptibility to follow.    Growth in anaerobic bottle: Gram Negative Rods    "Due to technical problems, Proteus sp. will Not be reported as part of    the BCID panel until further notice"***Blood Panel PCR results on this    specimen are available    approximately 3 hours after the Gram stain result.***    Gram stain, PCR, and/or culture results may not always    correspond due to difference in methodologies.    ************************************************************    This PCR assay was performed using [employer].    The following targets are tested for: Enterococcus,    vancomycin resistant enterococci, Listeria monocytogenes,    coagulase negative staphylococci, S. aureus,    methicillin resistant S. aureus, Streptococcus agalactiae    (Group B), S. pneumoniae, S. pyogenes (Group A),    Acinetobacter baumannii, Enterobacter cloacae, E. coli,    Klebsiella oxytoca, K. pneumoniae, Proteus sp.,    Serratia marcescens, Haemophilus influenzae,    Neisseria meningitidis, Pseudomonas aeruginosa, Candida    albicans, C. glabrata, C krusei, C parapsilosis,    C. tropicalis and the KPC resistance gene.  Organism: Blood Culture PCR (12 Sep 2019 16:09)  Organism: Blood Culture PCR (12 Sep 2019 16:09)    Culture - Blood (collected 12 Sep 2019 00:19)  Source: .Blood Blood-Peripheral  Gram Stain (12 Sep 2019 16:03):    Growth in anaerobic bottle: Gram Positive Cocci in Pairs and Chains and    Gram Negative Rods    Growth in aerobic bottle: Gram Positive Cocci in Pairs and Chains and    Gram Negative Rods  Preliminary Report (13 Sep 2019 23:25):    Growth in aerobic and anaerobic bottles: Enterococcus faecalis and Gram    Negative Rods    See previous culture [id number]    Culture - Urine (collected 12 Sep 2019 00:10)  Source: .Urine Clean Catch (Midstream)  Final Report (13 Sep 2019 22:54):    >100,000 CFU/ml Klebsiella pneumoniae  Organism: Klebsiella pneumoniae (13 Sep 2019 22:54)  Organism: Klebsiella pneumoniae (13 Sep 2019 22:54)            < from: CT Abdomen and Pelvis No Cont (09.11.19 @ 22:35) >    EXAM:  CT ABDOMEN AND PELVIS                          EXAM:  CT CHEST                            PROCEDURE DATE:  09/11/2019          INTERPRETATION:  [facility] RADIOLOGIST PRELIMINARY REPORT - [facility] Radiologist   Dr. Jason Jackson    EXAM:   CT Chest Without Contrast     EXAM DATE/TIME:   9/11/2019 10:03 PM     CLINICAL HISTORY:   78 years old, male; Patient HX: Fever; Hematuria. HX of parkinson disease     TECHNIQUE:   Imaging protocol: Computed tomography of the chest without contrast.     COMPARISON:   No relevant prior studies available.     FINDINGS:     Evaluation of the heart, vascular structures, and intra-abdominal organs   is limited without the administration of IV contrast.    Lungs: Mild to moderate bibasilar airspace disease and/or atelectasis,   worse on the left. Patchy airspace disease right middle lobe.   Pleural space: Mild pleural thickening right lung apex with a possible   8mm pleural based nodular density.   Heart: Unremarkable. No cardiomegaly. No pericardial effusion.   Aorta: Aorta demonstrates mild atherosclerotic calcification.   Lymph nodes: Unremarkable. No enlarged lymph nodes.   Bones/joints: Unremarkable. No acute fracture.   Soft tissues: Unremarkable.     IMPRESSION:     1. Mild pleural thickening right lung apex with a 8mm pleural based   nodular density.     2. Mild to moderate bibasilar airspace disease and/or atelectasis, worse   on the left. Patchy airspace disease right middle lobe.       =========================  EXAM:   CT Abdomen and Pelvis Without Contrast     EXAM DATE/TIME:   9/11/2019 10:03 PM     CLINICAL HISTORY:   78 years old, male; Patient HX: Ever; Hematuria. HX of parkinson disease     TECHNIQUE:   Imaging protocol: Computed tomography of the abdomen and pelvis without   contrast.   3D rendering: MIP reconstructed images were created and reviewed.     COMPARISON:   No relevant prior studies available.     FINDINGS:     Liver: Normal. No mass.   Gallbladder and bile ducts: Gallbladder surgically absent.   Pancreas: Normal. No ductal dilation.   Spleen: Normal. No splenomegaly.   Adrenals: Normal. No mass.   Kidneys and ureters: Focal right renal hypodensity that cannot be further   characterized on the current noncontrast examination. No hydronephrosis.   No ureteral stones. Mild bilateral perinephric edematous change, left   greater than right.   No hydronephrosis. An inflammatory process such as pyelonephritis is a   consideration. Correlate clinically.   Stomach and bowel: Above average stool throughout the colon.Previous   right colectomy. Borderline mild nonspecific colitis.   Appendix: No evidence of appendicitis.   Intraperitoneal space: Unremarkable. No free air. No significant fluid   collection.   Vasculature: Aorta demonstrates mild atherosclerotic calcification.   Lymph nodes: Unremarkable. No enlarged lymph nodes.     Bladder: Bladder decompressed by a Gatica catheter. Tiny layering stones   within the right side of the bladder. Small amount of intraluminal air   consistent with instrumentation.   Reproductive: Hypertrophy of the prostate.   Bones/joints: Nonspecific area of sclerosis involving the left side of   the vertebral body and pedicle at L4. No acute fracture.   Soft tissues: Unremarkable.     IMPRESSION:     1. Mild bilateral perinephric edematous change, left greater than right.   No hydronephrosis. An inflammatory process such as pyelonephritis is a   consideration. Correlate clinically.     2. Previous right colectomy. Borderline mild nonspecific colitis.    < end of copied text >

## 2019-09-15 LAB
ANION GAP SERPL CALC-SCNC: 7 MMOL/L — SIGNIFICANT CHANGE UP (ref 5–17)
BUN SERPL-MCNC: 12 MG/DL — SIGNIFICANT CHANGE UP (ref 7–23)
CALCIUM SERPL-MCNC: 7.7 MG/DL — LOW (ref 8.5–10.1)
CHLORIDE SERPL-SCNC: 108 MMOL/L — SIGNIFICANT CHANGE UP (ref 96–108)
CO2 SERPL-SCNC: 25 MMOL/L — SIGNIFICANT CHANGE UP (ref 22–31)
CREAT SERPL-MCNC: 0.76 MG/DL — SIGNIFICANT CHANGE UP (ref 0.5–1.3)
GLUCOSE SERPL-MCNC: 90 MG/DL — SIGNIFICANT CHANGE UP (ref 70–99)
HCT VFR BLD CALC: 29.3 % — LOW (ref 39–50)
HGB BLD-MCNC: 9.9 G/DL — LOW (ref 13–17)
MAGNESIUM SERPL-MCNC: 1.8 MG/DL — SIGNIFICANT CHANGE UP (ref 1.6–2.6)
MCHC RBC-ENTMCNC: 30.9 PG — SIGNIFICANT CHANGE UP (ref 27–34)
MCHC RBC-ENTMCNC: 33.8 GM/DL — SIGNIFICANT CHANGE UP (ref 32–36)
MCV RBC AUTO: 91.6 FL — SIGNIFICANT CHANGE UP (ref 80–100)
NRBC # BLD: 0 /100 WBCS — SIGNIFICANT CHANGE UP (ref 0–0)
PLATELET # BLD AUTO: 117 K/UL — LOW (ref 150–400)
POTASSIUM SERPL-MCNC: 3.2 MMOL/L — LOW (ref 3.5–5.3)
POTASSIUM SERPL-SCNC: 3.2 MMOL/L — LOW (ref 3.5–5.3)
RBC # BLD: 3.2 M/UL — LOW (ref 4.2–5.8)
RBC # FLD: 12.9 % — SIGNIFICANT CHANGE UP (ref 10.3–14.5)
SODIUM SERPL-SCNC: 140 MMOL/L — SIGNIFICANT CHANGE UP (ref 135–145)
WBC # BLD: 11.87 K/UL — HIGH (ref 3.8–10.5)
WBC # FLD AUTO: 11.87 K/UL — HIGH (ref 3.8–10.5)

## 2019-09-15 RX ORDER — POTASSIUM CHLORIDE 20 MEQ
10 PACKET (EA) ORAL ONCE
Refills: 0 | Status: COMPLETED | OUTPATIENT
Start: 2019-09-15 | End: 2019-09-15

## 2019-09-15 RX ADMIN — PIPERACILLIN AND TAZOBACTAM 25 GRAM(S): 4; .5 INJECTION, POWDER, LYOPHILIZED, FOR SOLUTION INTRAVENOUS at 21:39

## 2019-09-15 RX ADMIN — Medication 3 MILLILITER(S): at 14:32

## 2019-09-15 RX ADMIN — Medication 3 MILLILITER(S): at 07:36

## 2019-09-15 RX ADMIN — ENOXAPARIN SODIUM 40 MILLIGRAM(S): 100 INJECTION SUBCUTANEOUS at 12:35

## 2019-09-15 RX ADMIN — PIPERACILLIN AND TAZOBACTAM 25 GRAM(S): 4; .5 INJECTION, POWDER, LYOPHILIZED, FOR SOLUTION INTRAVENOUS at 14:57

## 2019-09-15 RX ADMIN — Medication 100 MILLIEQUIVALENT(S): at 12:35

## 2019-09-15 RX ADMIN — SODIUM CHLORIDE 50 MILLILITER(S): 9 INJECTION, SOLUTION INTRAVENOUS at 21:39

## 2019-09-15 RX ADMIN — Medication 3 MILLILITER(S): at 19:24

## 2019-09-15 RX ADMIN — Medication 250 MILLIGRAM(S): at 05:02

## 2019-09-15 RX ADMIN — Medication 250 MILLIGRAM(S): at 18:18

## 2019-09-15 RX ADMIN — PIPERACILLIN AND TAZOBACTAM 25 GRAM(S): 4; .5 INJECTION, POWDER, LYOPHILIZED, FOR SOLUTION INTRAVENOUS at 05:57

## 2019-09-15 NOTE — PROVIDER CONTACT NOTE (CHANGE IN STATUS NOTIFICATION) - SITUATION
DR Moskovitz avare about K 3.2, difficulty swallowing and NPO status reported, rash at the mouth area noted reported to md

## 2019-09-15 NOTE — PROGRESS NOTE ADULT - SUBJECTIVE AND OBJECTIVE BOX
Neurology Follow up note    KAITLIN LUX78yMale    HPI:  patient with parkinsons , dementia , bed ridden ,   with gross hematuria chronic floy , and developed fever , and and increased stiffness  patient has molst with dnr and dni and cmo but do abx if needed (12 Sep 2019 14:49)      Interval History - no new events    Patient is seen, chart was reviewed and case was discussed with the treatment team.  Pt is not in any distress.   Lying on bed comfortably.   No events reported overnight.   No clinical seizure was reported.  Sitting on chair bed comfortably.    is at bedside.    Vital Signs Last 24 Hrs  T(C): 37.1 (15 Sep 2019 07:19), Max: 37.2 (14 Sep 2019 15:26)  T(F): 98.8 (15 Sep 2019 07:19), Max: 99 (14 Sep 2019 15:26)  HR: 80 (15 Sep 2019 07:19) (62 - 83)  BP: 108/67 (15 Sep 2019 07:19) (107/58 - 111/69)  BP(mean): --  RR: 17 (15 Sep 2019 07:19) (16 - 18)  SpO2: 96% (15 Sep 2019 07:19) (93% - 97%)        REVIEW OF SYSTEMS:    limited due to dementia  not in any distress.    On Neurological Examination:    Mental Status - Pt is awake, not following commands properly      Speech -  Non verbal now    Cranial Nerves - Pupils 3 mm equal and reactive to light,   Pt has no facial asymmetry. Facial sensation is intact.  Tongue - is in midline.    Muscle tone - cogwheel rigidity    Motor Exam - 2-3/5 grossly.    Sensory Exam - Pt withdraws all extremities equally on stimulation. No asymmetry seen.         Deep tendon Reflexes - 2 plus all over.          Neck Supple -  Yes.     MEDICATIONS    acetaminophen  Suppository .. 650 milliGRAM(s) Rectal every 6 hours PRN  ALBUTerol/ipratropium for Nebulization. 3 milliLiter(s) Nebulizer three times a day  carbidopa/levodopa  25/100 1 Tablet(s) Oral three times a day  enoxaparin Injectable 40 milliGRAM(s) SubCutaneous daily  piperacillin/tazobactam IVPB.. 3.375 Gram(s) IV Intermittent every 8 hours  sodium chloride 0.45%. 1000 milliLiter(s) IV Continuous <Continuous>  vancomycin  IVPB 1000 milliGRAM(s) IV Intermittent every 12 hours      Allergies    No Known Allergies    Intolerances        LABS:  CBC Full  -  ( 15 Sep 2019 09:06 )  WBC Count : 11.87 K/uL  RBC Count : 3.20 M/uL  Hemoglobin : 9.9 g/dL  Hematocrit : 29.3 %  Platelet Count - Automated : 117 K/uL  Mean Cell Volume : 91.6 fl  Mean Cell Hemoglobin : 30.9 pg  Mean Cell Hemoglobin Concentration : 33.8 gm/dL  Auto Neutrophil # : x  x      09-15    140  |  108  |  12  ----------------------------<  90  3.2<L>   |  25  |  0.76    Ca    7.7<L>      15 Sep 2019 09:06  Mg     1.8     09-15      Hemoglobin A1C:     Vitamin B12     RADIOLOGY    ASSESSMENT AND PLAN:      ams likely metabolic.  PD.  DEMENTIA,    CONTINUE SINEMET.  Physical therapy evaluation.  OOB to chair/ambulation with assistance only.  Pain is accessed and addressed.  Would continue to follow.

## 2019-09-15 NOTE — PROGRESS NOTE ADULT - SUBJECTIVE AND OBJECTIVE BOX
pt  seen on rounds remains lethargic  wbc im[proving  lytes pending  lungs poor effort cor regular rate abdopmen soft  extremities no changes to   follow labs

## 2019-09-16 DIAGNOSIS — R21 RASH AND OTHER NONSPECIFIC SKIN ERUPTION: ICD-10-CM

## 2019-09-16 LAB
ANION GAP SERPL CALC-SCNC: 9 MMOL/L — SIGNIFICANT CHANGE UP (ref 5–17)
BUN SERPL-MCNC: 12 MG/DL — SIGNIFICANT CHANGE UP (ref 7–23)
CALCIUM SERPL-MCNC: 8 MG/DL — LOW (ref 8.5–10.1)
CHLORIDE SERPL-SCNC: 109 MMOL/L — HIGH (ref 96–108)
CO2 SERPL-SCNC: 23 MMOL/L — SIGNIFICANT CHANGE UP (ref 22–31)
CREAT SERPL-MCNC: 1.2 MG/DL — SIGNIFICANT CHANGE UP (ref 0.5–1.3)
GLUCOSE SERPL-MCNC: 109 MG/DL — HIGH (ref 70–99)
HCT VFR BLD CALC: 30.3 % — LOW (ref 39–50)
HGB BLD-MCNC: 10.2 G/DL — LOW (ref 13–17)
MCHC RBC-ENTMCNC: 30.5 PG — SIGNIFICANT CHANGE UP (ref 27–34)
MCHC RBC-ENTMCNC: 33.7 GM/DL — SIGNIFICANT CHANGE UP (ref 32–36)
MCV RBC AUTO: 90.7 FL — SIGNIFICANT CHANGE UP (ref 80–100)
NRBC # BLD: 0 /100 WBCS — SIGNIFICANT CHANGE UP (ref 0–0)
PLATELET # BLD AUTO: 127 K/UL — LOW (ref 150–400)
POTASSIUM SERPL-MCNC: 3.2 MMOL/L — LOW (ref 3.5–5.3)
POTASSIUM SERPL-SCNC: 3.2 MMOL/L — LOW (ref 3.5–5.3)
RBC # BLD: 3.34 M/UL — LOW (ref 4.2–5.8)
RBC # FLD: 12.8 % — SIGNIFICANT CHANGE UP (ref 10.3–14.5)
SODIUM SERPL-SCNC: 141 MMOL/L — SIGNIFICANT CHANGE UP (ref 135–145)
WBC # BLD: 9.2 K/UL — SIGNIFICANT CHANGE UP (ref 3.8–10.5)
WBC # FLD AUTO: 9.2 K/UL — SIGNIFICANT CHANGE UP (ref 3.8–10.5)

## 2019-09-16 PROCEDURE — 71045 X-RAY EXAM CHEST 1 VIEW: CPT | Mod: 26

## 2019-09-16 RX ORDER — VALACYCLOVIR 500 MG/1
1000 TABLET, FILM COATED ORAL EVERY 12 HOURS
Refills: 0 | Status: DISCONTINUED | OUTPATIENT
Start: 2019-09-16 | End: 2019-09-16

## 2019-09-16 RX ORDER — ACYCLOVIR SODIUM 500 MG
270 VIAL (EA) INTRAVENOUS EVERY 12 HOURS
Refills: 0 | Status: DISCONTINUED | OUTPATIENT
Start: 2019-09-17 | End: 2019-09-19

## 2019-09-16 RX ORDER — POTASSIUM CHLORIDE 20 MEQ
10 PACKET (EA) ORAL ONCE
Refills: 0 | Status: COMPLETED | OUTPATIENT
Start: 2019-09-16 | End: 2019-09-16

## 2019-09-16 RX ADMIN — SODIUM CHLORIDE 50 MILLILITER(S): 9 INJECTION, SOLUTION INTRAVENOUS at 18:05

## 2019-09-16 RX ADMIN — PIPERACILLIN AND TAZOBACTAM 25 GRAM(S): 4; .5 INJECTION, POWDER, LYOPHILIZED, FOR SOLUTION INTRAVENOUS at 13:49

## 2019-09-16 RX ADMIN — Medication 3 MILLILITER(S): at 07:32

## 2019-09-16 RX ADMIN — Medication 100 MILLIEQUIVALENT(S): at 09:11

## 2019-09-16 RX ADMIN — CARBIDOPA AND LEVODOPA 1 TABLET(S): 25; 100 TABLET ORAL at 13:49

## 2019-09-16 RX ADMIN — CARBIDOPA AND LEVODOPA 1 TABLET(S): 25; 100 TABLET ORAL at 22:02

## 2019-09-16 RX ADMIN — PIPERACILLIN AND TAZOBACTAM 25 GRAM(S): 4; .5 INJECTION, POWDER, LYOPHILIZED, FOR SOLUTION INTRAVENOUS at 22:02

## 2019-09-16 RX ADMIN — ENOXAPARIN SODIUM 40 MILLIGRAM(S): 100 INJECTION SUBCUTANEOUS at 13:34

## 2019-09-16 RX ADMIN — PIPERACILLIN AND TAZOBACTAM 25 GRAM(S): 4; .5 INJECTION, POWDER, LYOPHILIZED, FOR SOLUTION INTRAVENOUS at 05:56

## 2019-09-16 RX ADMIN — Medication 250 MILLIGRAM(S): at 05:30

## 2019-09-16 RX ADMIN — VALACYCLOVIR 1000 MILLIGRAM(S): 500 TABLET, FILM COATED ORAL at 18:05

## 2019-09-16 RX ADMIN — Medication 3 MILLILITER(S): at 13:39

## 2019-09-16 NOTE — PROGRESS NOTE ADULT - SUBJECTIVE AND OBJECTIVE BOX
Neurology follow up note    KAITLIN LUX78yMale      Interval History:    Patient seen with son resting in bed     MEDICATIONS    acetaminophen  Suppository .. 650 milliGRAM(s) Rectal every 6 hours PRN  ALBUTerol/ipratropium for Nebulization. 3 milliLiter(s) Nebulizer three times a day  carbidopa/levodopa  25/100 1 Tablet(s) Oral three times a day  enoxaparin Injectable 40 milliGRAM(s) SubCutaneous daily  piperacillin/tazobactam IVPB.. 3.375 Gram(s) IV Intermittent every 8 hours  sodium chloride 0.45%. 1000 milliLiter(s) IV Continuous <Continuous>  valACYclovir 1000 milliGRAM(s) Oral every 12 hours      Allergies    No Known Allergies    Intolerances            Vital Signs Last 24 Hrs  T(C): 36.5 (16 Sep 2019 08:11), Max: 37.7 (16 Sep 2019 00:18)  T(F): 97.7 (16 Sep 2019 08:11), Max: 99.8 (16 Sep 2019 00:18)  HR: 62 (16 Sep 2019 08:11) (54 - 89)  BP: 121/63 (16 Sep 2019 08:11) (117/70 - 159/67)  BP(mean): --  RR: 16 (16 Sep 2019 08:11) (16 - 18)  SpO2: 99% (16 Sep 2019 08:11) (95% - 100%)      REVIEW OF SYSTEMS: Non Verbal  Limited or unable to obtain secondary to patient's poor mental status.        NEUROLOGIC:  The patient was awake in bed.     The patient will not follow any commands.  Unable to gaze extraocular movements, but positive blink to bilateral visual threat.    The patient has positive masked face.    The patient is nonverbal.    Motor:  Elevated bilateral upper extremities.  The patient was able to maintain elevated position, so would say 4/5.    Positive resting tremors were noted.    Positive cogwheel rigidity was noted.    Positive bradykinesia was noted in the upper extremities.    Bilateral lower extremities had significant tone.  Slight flexation at the hip and knee.  As per my conversation with son, it appears that the patient is mostly wheelchair bound.      GENERAL Exam: Nontoxic , No Acute Distress   	  HEENT:   face mouth lesion suspect HSV  		  LUNGS: Clear bilaterally  No Wheeze  Decreased bilaterally  	  HEART: Normal S1S2   No murmur RRR        	  GI/ ABDOMEN:  Soft  Non tender    EXTREMITIES:   No Edema  No Clubbing  No Cyanosis     MUSCULOSKELETAL: Normal Range of Motion  	   SKIN: Normal  No Ecchymosis               LABS:  CBC Full  -  ( 16 Sep 2019 07:46 )  WBC Count : 9.20 K/uL  RBC Count : 3.34 M/uL  Hemoglobin : 10.2 g/dL  Hematocrit : 30.3 %  Platelet Count - Automated : 127 K/uL  Mean Cell Volume : 90.7 fl  Mean Cell Hemoglobin : 30.5 pg  Mean Cell Hemoglobin Concentration : 33.7 gm/dL  Auto Neutrophil # : x  Auto Lymphocyte # : x  Auto Monocyte # : x  Auto Eosinophil # : x  Auto Basophil # : x  Auto Neutrophil % : x  Auto Lymphocyte % : x  Auto Monocyte % : x  Auto Eosinophil % : x  Auto Basophil % : x      09-16    141  |  109<H>  |  12  ----------------------------<  109<H>  3.2<L>   |  23  |  1.20    Ca    8.0<L>      16 Sep 2019 07:47  Mg     1.8     09-15      Hemoglobin A1C:       Vitamin B12         RADIOLOGY      ANALYSIS AND PLAN:  A 78-year-old with history of dementia and Parkinson's.  1.	For history of dementia, most likely this is very advanced.  I do not feel that adding medications will be of any benefit for the patient.  2.	For history of underlying Parkinson's, suspect the patient does have Parkinson's exacerbation, which is multifactorial secondary to underlying infectious type process and dehydration.  For now, I will continue the patient on his current dose of Sinemet.  Once the patient's general medical issues are rectified, if the patient still appears to have significant symptoms of Parkinson's, I would recommend consideration of increasing the patient's Parkinson's medications to 37.5 mg three times a day and adjust accordingly.  3.	I would recommend fall precautions.  4.	Monitor oral intake.  5.	HSV ID follow antibiotics as needed   6.	I spoke with son, Armando at  bedside [id number].  He understands my reasoning and thought process.  7.	prognosis guarded   8.	Greater than 45 minutes of time was spent on the patient, plan of care, reviewing data, speaking to family and multidisciplinary healthcare team.    Thank you for the courtesy of consultation.

## 2019-09-16 NOTE — SWALLOW BEDSIDE ASSESSMENT ADULT - COMMENTS
The patient was seen this afternoon for a re-assessment of swallow function, at which time he was awake, though nonverbal. Patient currently receiving supplemental O2 via NC in place. The patient is known to this department as he was initially seen for a bedside swallow evaluation on 9/12/19 (please see full report for details), at which time oral nutrition/hydration was contraindicated.     Per charting, the patient is a "79 yo male hx of bph, dementia, parkinsons, sent from [facility] for hematuria, found to have 106 rectal temp, patient nonverbal.  Unable to obtain further history from pain. No family at bedside." Recent CT of the head revealed, "No acute intracranial abnormality. Chronic microvascular ischemic changes." Recent CXR revealed, "Right lower lobe pneumonia." Discussed results and recommendations from this evaluation with the RN and call out to MD.
The patient was seen at bedside this AM for an initial assessment of swallow function, at which time he was awake, though nonverbal. Per charting, the patient is a "77 yo male hx of bph, dementia, parkinsons, sent from [facility] for hematuria, found to have 106 rectal temp, patient nonverbal.  Unable to obtain further history from pain. No family at bedside." Recent CT of the head revealed, "No acute intracranial abnormality. Chronic microvascular ischemic changes." In addition, recent CT of the chest revealed, "Mild bilateral perinephric edematous change, left greater than right. No hydronephrosis. An inflammatory process such as pyelonephritis is a consideration. Correlate clinically. Previous right colectomy. Borderline mild nonspecific colitis." Discussed results and recommendations from this evaluation with the RN and call out to MD.

## 2019-09-16 NOTE — SWALLOW BEDSIDE ASSESSMENT ADULT - ADDITIONAL RECOMMENDATIONS
This department to continue to follow during this admission as schedule permits.
This department to continue to follow during this admission as schedule permits.

## 2019-09-16 NOTE — GOALS OF CARE CONVERSATION - PERSONAL ADVANCE DIRECTIVE - CONVERSATION DETAILS
[facility]: :  Pt approved for hospice at a SNF . Joaquim Roberts in full agreement with  hospice care at [facility] . [facility] consents signed . [facility] will take Pt back with hospice care. and Pt will continue to be private pay for SNF.   Celeste Bruno RN CHPN

## 2019-09-16 NOTE — CHART NOTE - NSCHARTNOTEFT_GEN_A_CORE
Called by RN for pt with extensive facial rash, inability to tolerate PO meds. Per chart review, pt is NPO given high risk of aspiration. Pt seen at bedside. Extensive erythematous rash noted across pt's lips tracking to pt's R cheek. Mild crusting noted across lesions. D/w pharmacy, will change pt's valacyclovir to IV acyclovir at this time. RN to call with any changes, AM team to follow.    Vital Signs Last 24 Hrs  T(C): 36.6 (16 Sep 2019 17:55), Max: 37.7 (16 Sep 2019 00:18)  T(F): 97.9 (16 Sep 2019 17:55), Max: 99.8 (16 Sep 2019 00:18)  HR: 61 (16 Sep 2019 17:55) (60 - 80)  BP: 133/74 (16 Sep 2019 17:55) (117/70 - 159/67)  BP(mean): --  RR: 17 (16 Sep 2019 17:55) (16 - 18)  SpO2: 99% (16 Sep 2019 17:55) (95% - 100%)

## 2019-09-16 NOTE — SWALLOW BEDSIDE ASSESSMENT ADULT - SWALLOW EVAL: RECOMMENDED DIET
1. Oral nutrition/hydration is contraindicated at this time. 2. Consider short-term vs long-term non-oral means of nutrition/hydration as the patient is at an increased nutritional and aspiration risk at this time. 3. Discuss the goals of care regarding the nutritional plan of care with the patient's family members.
1. Oral nutrition/hydration is contraindicated at this time. 2. Consider short term non-oral means of nutrition/hydration as the patient is at an increased nutritional and aspiration risk at this time.

## 2019-09-16 NOTE — SWALLOW BEDSIDE ASSESSMENT ADULT - ASR SWALLOW ASPIRATION MONITOR
oral hygiene/pneumonia/gurgly voice/change of breathing pattern/position upright (90Y)/cough/fever/throat clearing/upper respiratory infection
change of breathing pattern/position upright (90Y)/fever/throat clearing/pneumonia/upper respiratory infection/cough/oral hygiene/gurgly voice

## 2019-09-16 NOTE — PROGRESS NOTE ADULT - SUBJECTIVE AND OBJECTIVE BOX
PCP  Subjective:   in bed , awake ,     Objective:   Vital Signs Last 24 Hrs  T(C): 36.5 (09-16-19 @ 08:11), Max: 37.7 (09-16-19 @ 00:18)  T(F): 97.7 (09-16-19 @ 08:11), Max: 99.8 (09-16-19 @ 00:18)  HR: 62 (09-16-19 @ 08:11) (54 - 89)  BP: 121/63 (09-16-19 @ 08:11) (117/70 - 159/67)  BP(mean): --  RR: 16 (09-16-19 @ 08:11) (16 - 18)  SpO2: 99% (09-16-19 @ 08:11) (95% - 100%)  Daily     Daily       GENERAL:  wdwn male , awake , non verbal , has a cough  EYES: open   NECK: general stiffness  CHEST/LUNG: clear  HEART: s1 s2 regular  ABDOMEN:  soft  EXTREMITIES:  no edema   SKIN:  warm   CNS:  advance parkinson's , stiffness, non verbal        Allergies: Allergies    No Known Allergies    Intolerances        Home Medications:  carbidopa-levodopa 10 mg-100 mg oral tablet: 1 tab(s) orally 4 times a day (11 Sep 2019 22:20)  finasteride 5 mg oral tablet: 1 tab(s) orally once a day (11 Sep 2019 22:20)  metoprolol succinate 25 mg oral tablet, extended release: 1 tab(s) orally once a day (11 Sep 2019 22:20)  simvastatin 40 mg oral tablet: 1 tab(s) orally once a day (at bedtime) (11 Sep 2019 22:20)  tamsulosin 0.4 mg oral capsule: 1 cap(s) orally once a day (11 Sep 2019 22:20)  tolterodine 4 mg oral capsule, extended release: 1 cap(s) orally once a day (11 Sep 2019 22:20)    Medications:   acetaminophen  Suppository .. 650 milliGRAM(s) Rectal every 6 hours PRN  ALBUTerol/ipratropium for Nebulization. 3 milliLiter(s) Nebulizer three times a day  carbidopa/levodopa  25/100 1 Tablet(s) Oral three times a day  enoxaparin Injectable 40 milliGRAM(s) SubCutaneous daily  piperacillin/tazobactam IVPB.. 3.375 Gram(s) IV Intermittent every 8 hours  potassium chloride  10 mEq/100 mL IVPB 10 milliEquivalent(s) IV Intermittent once  sodium chloride 0.45%. 1000 milliLiter(s) IV Continuous <Continuous>  vancomycin  IVPB 1000 milliGRAM(s) IV Intermittent every 12 hours      LABS:                        10.2   9.20  )-----------( 127      ( 16 Sep 2019 07:46 )             30.3     09-16    141  |  109<H>  |  12  ----------------------------<  109<H>  3.2<L>   |  23  |  1.20    Ca    8.0<L>      16 Sep 2019 07:47  Mg     1.8     09-15        09-11 @ 20:12  INR 1.51        CAPILLARY BLOOD GLUCOSE              RECENT CULTURES:  Culture Results:   No growth to date. (09-14 @ 19:00)  Culture Results:   No growth to date. (09-14 @ 19:00)  Culture Results:   Growth in aerobic and anaerobic bottles: Enterococcus faecalis and  Klebsiella pneumoniae  See previous culture [id number] (09-12 @ 00:19)  Culture Results:   Growth in aerobic and anaerobic bottles: Enterococcus faecalis and  Klebsiella pneumoniae  "Due to technical problems, Proteus sp. will Not be reported as part of  the BCID panel until further notice" ***Blood Panel PCR results on this  specimen are available  approximately 3 hours after the Gram stain result.***  Gram stain, PCR, and/or culture results may not always  correspond due to difference in methodologies.  ************************************************************  This PCR assay was performed using [employer].  The following targets are tested for: Enterococcus,  vancomycin resistant enterococci, Listeria monocytogenes,  coagulase negative staphylococci, S. aureus,  methicillin resistant S. aureus, Streptococcus agalactiae  (Group B), S. pneumoniae, S. pyogenes (Group A),  Acinetobacter baumannii, Enterobacter cloacae, E. coli,  Klebsiella oxytoca, K. pneumoniae, Proteus sp.,  Serratia marcescens, Haemophilus influenzae,  Neisseria meningitidis, Pseudomonas aeruginosa, Candida  albicans, C. glabrata, C krusei, C parapsilosis,  C. tropicalis and the KPC resistance gene. (09-12 @ 00:19)  Culture Results:   >100,000 CFU/ml Klebsiella pneumoniae (09-12 @ 00:10)        Culture - Blood (collected 09-14-19 @ 19:00)  Source: .Blood Blood-Peripheral  Preliminary Report (09-15-19 @ 19:01):    No growth to date.    Culture - Blood (collected 09-14-19 @ 19:00)  Source: .Blood Blood  Preliminary Report (09-15-19 @ 19:01):    No growth to date.    Culture - Blood (collected 09-12-19 @ 00:19)  Source: .Blood Blood-Peripheral  Gram Stain (09-12-19 @ 14:35):    Growth in aerobic bottle: Gram Positive Cocci in Pairs and Chains    Growth in anaerobic bottle: Gram Positive Cocci in Pairs and Chains and    Gram Negative Rods  Final Report (09-14-19 @ 22:31):    Growth in aerobic and anaerobic bottles: Enterococcus faecalis and    Klebsiella pneumoniae    "Due to technical problems, Proteus sp. will Not be reported as part of    the BCID panel until further notice" ***Blood Panel PCR results on this    specimen are available    approximately 3 hours after the Gram stain result.***    Gram stain, PCR, and/or culture results may not always    correspond due to difference in methodologies.    ************************************************************    This PCR assay was performed using [employer].    The following targets are tested for: Enterococcus,    vancomycin resistant enterococci, Listeria monocytogenes,    coagulase negative staphylococci, S. aureus,    methicillin resistant S. aureus, Streptococcus agalactiae    (Group B), S. pneumoniae, S. pyogenes (Group A),    Acinetobacter baumannii, Enterobacter cloacae, E. coli,    Klebsiella oxytoca, K. pneumoniae, Proteus sp.,    Serratia marcescens, Haemophilus influenzae,    Neisseria meningitidis, Pseudomonas aeruginosa, Candida    albicans, C. glabrata, C krusei, C parapsilosis,    C. tropicalis and the KPC resistance gene.  Organism: Blood Culture PCR  Enterococcus faecalis  Klebsiella pneumoniae (09-14-19 @ 22:31)  Organism: Klebsiella pneumoniae (09-14-19 @ 22:31)      -  Amikacin: S <=16      -  Ampicillin: R >16 These ampicillin results predict results for amoxicillin      -  Ampicillin/Sulbactam: S <=8/4 Enterobacter, Citrobacter, and Serratia may develop resistance during prolonged therapy (3-4 days)      -  Aztreonam: S <=4      -  Cefazolin: S <=8 Enterobacter, Citrobacter, and Serratia may develop resistance during prolonged therapy (3-4 days)      -  Cefepime: S <=4      -  Cefoxitin: S <=8      -  Ceftriaxone: S <=1 Enterobacter, Citrobacter, and Serratia may develop resistance during prolonged therapy      -  Ciprofloxacin: S <=1      -  Gentamicin: S <=4      -  Imipenem: S <=1      -  Levofloxacin: S <=2      -  Meropenem: S <=1      -  Piperacillin/Tazobactam: S <=16      -  Tobramycin: S <=4      -  Trimethoprim/Sulfamethoxazole: S <=2/38      Method Type: FRANK  Organism: Enterococcus faecalis (09-14-19 @ 22:31)      -  Ampicillin: S <=2 Predicts results to ampicillin/sulbactam, amoxacillin-clavulanate and  piperacillin-tazobactam.      -  Gentamicin synergy: S      -  Vancomycin: S 2      Method Type: FRANK  Organism: Blood Culture PCR (09-14-19 @ 22:31)      -  Enterococcus species: Detec      -  Klebsiella pneumoniae: Detec      Method Type: PCR    Culture - Blood (collected 09-12-19 @ 00:19)  Source: .Blood Blood-Peripheral  Gram Stain (09-12-19 @ 16:03):    Growth in anaerobic bottle: Gram Positive Cocci in Pairs and Chains and    Gram Negative Rods    Growth in aerobic bottle: Gram Positive Cocci in Pairs and Chains and    Gram Negative Rods  Final Report (09-14-19 @ 22:36):    Growth in aerobic and anaerobic bottles: Enterococcus faecalis and    Klebsiella pneumoniae    See previous culture [id number]    Culture - Urine (collected 09-12-19 @ 00:10)  Source: .Urine Clean Catch (Midstream)  Final Report (09-13-19 @ 22:54):    >100,000 CFU/ml Klebsiella pneumoniae  Organism: Klebsiella pneumoniae (09-13-19 @ 22:54)  Organism: Klebsiella pneumoniae (09-13-19 @ 22:54)      -  Amikacin: S <=16      -  Ampicillin: R >16 These ampicillin results predict results for amoxicillin      -  Ampicillin/Sulbactam: S <=8/4 Enterobacter, Citrobacter, and Serratia may develop resistance during prolonged therapy (3-4 days)      -  Aztreonam: S <=4      -  Cefazolin: S <=8 ([id number]_[id number]) For uncomplicated UTI with K. pneumoniae, E. coli, or P. mirablis: FRANK <=16 is sensitive and FRANK >=32 is resistant. This also predicts results for oral agents cefaclor, cefdinir, cefpodoxime, cefprozil, cefuroxime axetil, cephalexin and locarbef for uncomplicated UTI. Note that some isolates may be susceptible to these agents while testing resistant to cefazolin.      -  Cefepime: S <=4      -  Cefoxitin: S <=8      -  Ceftriaxone: S <=1 Enterobacter, Citrobacter, and Serratia may develop resistance during prolonged therapy      -  Ciprofloxacin: S <=1      -  Gentamicin: S <=4      -  Imipenem: S <=1      -  Levofloxacin: S <=2      -  Meropenem: S <=1      -  Nitrofurantoin: I 64 Should not be used to treat pyelonephritis      -  Piperacillin/Tazobactam: S <=16      -  Tigecycline: S <=2      -  Tobramycin: S <=4      -  Trimethoprim/Sulfamethoxazole: S <=2/38      Method Type: FRANK

## 2019-09-16 NOTE — SWALLOW BEDSIDE ASSESSMENT ADULT - MUCOSAL QUALITY
Per RN report, patient with suspected herpes on labial surfaces.
Patient exhibited reduced oral aperture when provided with tsp presentations and was unable to follow low-level verbal/visual directives to assess patient's oral cavity.

## 2019-09-16 NOTE — SWALLOW BEDSIDE ASSESSMENT ADULT - ASR SWALLOW REFERRAL
To facilitate adequate daily caloric intake./Registered Dietitian
To facilitate adequate daily caloric intake./Registered Dietitian

## 2019-09-16 NOTE — PROGRESS NOTE ADULT - SUBJECTIVE AND OBJECTIVE BOX
infectious diseases progress note:    KAITLIN LUX is a 78y y. o. Male patient    Patient with new facial rash    Allergies    No Known Allergies    Intolerances        ANTIBIOTICS/RELEVANT:  antimicrobials  piperacillin/tazobactam IVPB.. 3.375 Gram(s) IV Intermittent every 8 hours  valACYclovir 1000 milliGRAM(s) Oral every 12 hours    immunologic:    OTHER:  acetaminophen  Suppository .. 650 milliGRAM(s) Rectal every 6 hours PRN  ALBUTerol/ipratropium for Nebulization. 3 milliLiter(s) Nebulizer three times a day  carbidopa/levodopa  25/100 1 Tablet(s) Oral three times a day  enoxaparin Injectable 40 milliGRAM(s) SubCutaneous daily  sodium chloride 0.45%. 1000 milliLiter(s) IV Continuous <Continuous>      Objective:  Vital Signs Last 24 Hrs  T(C): 36.5 (16 Sep 2019 08:11), Max: 37.7 (16 Sep 2019 00:18)  T(F): 97.7 (16 Sep 2019 08:11), Max: 99.8 (16 Sep 2019 00:18)  HR: 62 (16 Sep 2019 08:11) (54 - 89)  BP: 121/63 (16 Sep 2019 08:11) (117/70 - 159/67)  BP(mean): --  RR: 16 (16 Sep 2019 08:11) (16 - 18)  SpO2: 99% (16 Sep 2019 08:11) (95% - 100%)    T(C): 36.5 (09-16-19 @ 08:11), Max: 37.7 (09-16-19 @ 00:18)  T(C): 36.5 (09-16-19 @ 08:11), Max: 37.7 (09-16-19 @ 00:18)  T(C): 36.5 (09-16-19 @ 08:11), Max: 37.7 (09-16-19 @ 00:18)    PHYSICAL EXAM:  Constitutional: Well-developed, well nourished  Eyes: PERRLA, EOMI  Ear/Nose/Throat: oropharynx normal	  Neck: no JVD, no lymphadenopathy, supple  Respiratory: no accessory muscle use  Cardiovascular: RRR,   Gastrointestinal: soft, NT  Extremities: no clubbing, no cyanosis, edema absent  Skin: rash on right side of face with vesicles on erythematous base      LABS:                        10.2   9.20  )-----------( 127      ( 16 Sep 2019 07:46 )             30.3       9.20 09-16 @ 07:46  11.87 09-15 @ 09:06  17.22 09-14 @ 07:50  23.99 09-13 @ 07:42  16.42 09-12 @ 06:53  2.28 09-11 @ 20:12      09-16    141  |  109<H>  |  12  ----------------------------<  109<H>  3.2<L>   |  23  |  1.20    Ca    8.0<L>      16 Sep 2019 07:47  Mg     1.8     09-15        Creatinine, Serum: 1.20 mg/dL (09-16-19 @ 07:47)  Creatinine, Serum: 0.76 mg/dL (09-15-19 @ 09:06)  Creatinine, Serum: 0.66 mg/dL (09-14-19 @ 07:50)  Creatinine, Serum: 0.93 mg/dL (09-13-19 @ 07:42)  Creatinine, Serum: 1.00 mg/dL (09-12-19 @ 06:53)  Creatinine, Serum: 1.10 mg/dL (09-11-19 @ 20:12)                MICROBIOLOGY:              RADIOLOGY & ADDITIONAL STUDIES:

## 2019-09-16 NOTE — SWALLOW BEDSIDE ASSESSMENT ADULT - SWALLOW EVAL: DIAGNOSIS
1. The patient demonstrated a severe oral dysphagia for puree presented via tsp marked by absent orientation to the tsp and absent oral acceptance marked by patient maintaining a tight labial seal despite maximum verbal/visual/tactile prompting. 2. Due to the severity of the oral phase deficits, the pharyngeal phase of the swallow could not be formally assessed.

## 2019-09-16 NOTE — PROGRESS NOTE ADULT - SUBJECTIVE AND OBJECTIVE BOX
CHIEF COMPLAINT/ PRESENT FINDINGS:  non verbal   son at bedside said he will inquire about hospice care       ****************************************************************************************************  PHYSICAL EXAM:    Vital Signs Last 24 Hrs  T(C): 36.5 (16 Sep 2019 08:11), Max: 37.7 (16 Sep 2019 00:18)  T(F): 97.7 (16 Sep 2019 08:11), Max: 99.8 (16 Sep 2019 00:18)  HR: 62 (16 Sep 2019 08:11) (54 - 89)  BP: 121/63 (16 Sep 2019 08:11) (117/70 - 159/67)  BP(mean): --  RR: 16 (16 Sep 2019 08:11) (16 - 18)  SpO2: 99% (16 Sep 2019 08:11) (95% - 100%)    GENERAL: non verbal     PENIS: with edmondson     TESTES:    PROSTATE/ RECTAL:    ABDOMEN: soft     BACK:    LOWER EXTREMITIES:    NEUROLOGICAL:    **********************************************************************************************************  LABS:                        10.2   9.20  )-----------( 127      ( 16 Sep 2019 07:46 )             30.3     09-16    141  |  109<H>  |  12  ----------------------------<  109<H>  3.2<L>   |  23  |  1.20    Ca    8.0<L>      16 Sep 2019 07:47  Mg     1.8     09-15          Urine Culture: 09-14 @ 19:00  Urine Culure Results   No growth to date.  Organism --      RADIOLOGY & ADDITIONAL STUDIES:  XAM:  CT ABDOMEN AND PELVIS                          EXAM:  CT CHEST                            PROCEDURE DATE:  09/11/2019          INTERPRETATION:  [facility] RADIOLOGIST PRELIMINARY REPORT - [facility] Radiologist   Dr. Jason Jackson    EXAM:   CT Chest Without Contrast     EXAM DATE/TIME:   9/11/2019 10:03 PM     CLINICAL HISTORY:   78 years old, male; Patient HX: Fever; Hematuria. HX of parkinson disease     TECHNIQUE:   Imaging protocol: Computed tomography of the chest without contrast.     COMPARISON:   No relevant prior studies available.     FINDINGS:     Evaluation of the heart, vascular structures, and intra-abdominal organs   is limited without the administration of IV contrast.    Lungs: Mild to moderate bibasilar airspace disease and/or atelectasis,   worse on the left. Patchy airspace disease right middle lobe.   Pleural space: Mild pleural thickening right lung apex with a possible   8mm pleural based nodular density.   Heart: Unremarkable. No cardiomegaly. No pericardial effusion.   Aorta: Aorta demonstrates mild atherosclerotic calcification.   Lymph nodes: Unremarkable. No enlarged lymph nodes.   Bones/joints: Unremarkable. No acute fracture.   Soft tissues: Unremarkable.     IMPRESSION:     1. Mild pleural thickening right lung apex with a 8mm pleural based   nodular density.     2. Mild to moderate bibasilar airspace disease and/or atelectasis, worse   on the left. Patchy airspace disease right middle lobe.       =========================  EXAM:   CT Abdomen and Pelvis Without Contrast     EXAM DATE/TIME:   9/11/2019 10:03 PM     CLINICAL HISTORY:   78 years old, male; Patient HX: Ever; Hematuria. HX of parkinson disease     TECHNIQUE:   Imaging protocol: Computed tomography of the abdomen and pelvis without   contrast.   3D rendering: MIP reconstructed images were created and reviewed.     COMPARISON:   No relevant prior studies available.     FINDINGS:     Liver: Normal. No mass.   Gallbladder and bile ducts: Gallbladder surgically absent.   Pancreas: Normal. No ductal dilation.   Spleen: Normal. No splenomegaly.   Adrenals: Normal. No mass.   Kidneys and ureters: Focal right renal hypodensity that cannot be further   characterized on the current noncontrast examination. No hydronephrosis.   No ureteral stones. Mild bilateral perinephric edematous change, left   greater than right.   No hydronephrosis. An inflammatory process such as pyelonephritis is a   consideration. Correlate clinically.   Stomach and bowel: Above average stool throughout the colon.Previous   right colectomy. Borderline mild nonspecific colitis.   Appendix: No evidence of appendicitis.   Intraperitoneal space: Unremarkable. No free air. No significant fluid   collection.   Vasculature: Aorta demonstrates mild atherosclerotic calcification.   Lymph nodes: Unremarkable. No enlarged lymph nodes.     Bladder: Bladder decompressed by a Edmondson catheter. Tiny layering stones   within the right side of the bladder. Small amount of intraluminal air   consistent with instrumentation.   Reproductive: Hypertrophy of the prostate.   Bones/joints: Nonspecific area of sclerosis involving the left side of   the vertebral body and pedicle at L4. No acute fracture.   Soft tissues: Unremarkable.     IMPRESSION:     1. Mild bilateral perinephric edematous change, left greater than right.   No hydronephrosis. An inflammatory process such as pyelonephritis is a   consideration. Correlate clinically.     2. Previous right colectomy. Borderline mild nonspecific colitis.      AGREE WITH THE ABOVE FINDINGS AND REPORT                BÁRBARA RICHARDSON M.D., ATTENDING RADIOLOGIST  This document has been electronically signed. Sep 12 2019  8:33AM            IMPRESSION: hx sepsis   chronic retention      PLAN: has new edmondson CHIEF COMPLAINT/ PRESENT FINDINGS:  non verbal no complaints   son at bedside said he will inquire about hospice care       ****************************************************************************************************  PHYSICAL EXAM:    Vital Signs Last 24 Hrs  T(C): 36.5 (16 Sep 2019 08:11), Max: 37.7 (16 Sep 2019 00:18)  T(F): 97.7 (16 Sep 2019 08:11), Max: 99.8 (16 Sep 2019 00:18)  HR: 62 (16 Sep 2019 08:11) (54 - 89)  BP: 121/63 (16 Sep 2019 08:11) (117/70 - 159/67)  BP(mean): --  RR: 16 (16 Sep 2019 08:11) (16 - 18)  SpO2: 99% (16 Sep 2019 08:11) (95% - 100%)    GENERAL: non verbal     PENIS: with edmondson     TESTES:    PROSTATE/ RECTAL:    ABDOMEN: soft     BACK:    LOWER EXTREMITIES:    NEUROLOGICAL:    **********************************************************************************************************  LABS:                        10.2   9.20  )-----------( 127      ( 16 Sep 2019 07:46 )             30.3     09-16    141  |  109<H>  |  12  ----------------------------<  109<H>  3.2<L>   |  23  |  1.20    Ca    8.0<L>      16 Sep 2019 07:47  Mg     1.8     09-15          Urine Culture: 09-14 @ 19:00  Urine Culure Results   No growth to date.  Organism --      RADIOLOGY & ADDITIONAL STUDIES:  XAM:  CT ABDOMEN AND PELVIS                          EXAM:  CT CHEST                            PROCEDURE DATE:  09/11/2019          INTERPRETATION:  [facility] RADIOLOGIST PRELIMINARY REPORT - [facility] Radiologist   Dr. Jason Jackson    EXAM:   CT Chest Without Contrast     EXAM DATE/TIME:   9/11/2019 10:03 PM     CLINICAL HISTORY:   78 years old, male; Patient HX: Fever; Hematuria. HX of parkinson disease     TECHNIQUE:   Imaging protocol: Computed tomography of the chest without contrast.     COMPARISON:   No relevant prior studies available.     FINDINGS:     Evaluation of the heart, vascular structures, and intra-abdominal organs   is limited without the administration of IV contrast.    Lungs: Mild to moderate bibasilar airspace disease and/or atelectasis,   worse on the left. Patchy airspace disease right middle lobe.   Pleural space: Mild pleural thickening right lung apex with a possible   8mm pleural based nodular density.   Heart: Unremarkable. No cardiomegaly. No pericardial effusion.   Aorta: Aorta demonstrates mild atherosclerotic calcification.   Lymph nodes: Unremarkable. No enlarged lymph nodes.   Bones/joints: Unremarkable. No acute fracture.   Soft tissues: Unremarkable.     IMPRESSION:     1. Mild pleural thickening right lung apex with a 8mm pleural based   nodular density.     2. Mild to moderate bibasilar airspace disease and/or atelectasis, worse   on the left. Patchy airspace disease right middle lobe.       =========================  EXAM:   CT Abdomen and Pelvis Without Contrast     EXAM DATE/TIME:   9/11/2019 10:03 PM     CLINICAL HISTORY:   78 years old, male; Patient HX: Ever; Hematuria. HX of parkinson disease     TECHNIQUE:   Imaging protocol: Computed tomography of the abdomen and pelvis without   contrast.   3D rendering: MIP reconstructed images were created and reviewed.     COMPARISON:   No relevant prior studies available.     FINDINGS:     Liver: Normal. No mass.   Gallbladder and bile ducts: Gallbladder surgically absent.   Pancreas: Normal. No ductal dilation.   Spleen: Normal. No splenomegaly.   Adrenals: Normal. No mass.   Kidneys and ureters: Focal right renal hypodensity that cannot be further   characterized on the current noncontrast examination. No hydronephrosis.   No ureteral stones. Mild bilateral perinephric edematous change, left   greater than right.   No hydronephrosis. An inflammatory process such as pyelonephritis is a   consideration. Correlate clinically.   Stomach and bowel: Above average stool throughout the colon.Previous   right colectomy. Borderline mild nonspecific colitis.   Appendix: No evidence of appendicitis.   Intraperitoneal space: Unremarkable. No free air. No significant fluid   collection.   Vasculature: Aorta demonstrates mild atherosclerotic calcification.   Lymph nodes: Unremarkable. No enlarged lymph nodes.     Bladder: Bladder decompressed by a Edmondson catheter. Tiny layering stones   within the right side of the bladder. Small amount of intraluminal air   consistent with instrumentation.   Reproductive: Hypertrophy of the prostate.   Bones/joints: Nonspecific area of sclerosis involving the left side of   the vertebral body and pedicle at L4. No acute fracture.   Soft tissues: Unremarkable.     IMPRESSION:     1. Mild bilateral perinephric edematous change, left greater than right.   No hydronephrosis. An inflammatory process such as pyelonephritis is a   consideration. Correlate clinically.     2. Previous right colectomy. Borderline mild nonspecific colitis.      AGREE WITH THE ABOVE FINDINGS AND REPORT                BÁRBARA RICHARDSON M.D., ATTENDING RADIOLOGIST  This document has been electronically signed. Sep 12 2019  8:33AM            IMPRESSION: hx sepsis resolved   chronic retention and  small bladder stones   hx hematuria resolved     PLAN: has new edmondson

## 2019-09-17 DIAGNOSIS — B00.9 HERPESVIRAL INFECTION, UNSPECIFIED: ICD-10-CM

## 2019-09-17 LAB
HSV+VZV DNA SPEC QL NAA+PROBE: ABNORMAL
SPECIMEN SOURCE: SIGNIFICANT CHANGE UP

## 2019-09-17 RX ORDER — MORPHINE SULFATE 50 MG/1
2 CAPSULE, EXTENDED RELEASE ORAL
Refills: 0 | Status: DISCONTINUED | OUTPATIENT
Start: 2019-09-17 | End: 2019-09-19

## 2019-09-17 RX ADMIN — Medication 105.4 MILLIGRAM(S): at 18:30

## 2019-09-17 RX ADMIN — Medication 3 MILLILITER(S): at 15:02

## 2019-09-17 RX ADMIN — CARBIDOPA AND LEVODOPA 1 TABLET(S): 25; 100 TABLET ORAL at 05:17

## 2019-09-17 RX ADMIN — PIPERACILLIN AND TAZOBACTAM 25 GRAM(S): 4; .5 INJECTION, POWDER, LYOPHILIZED, FOR SOLUTION INTRAVENOUS at 05:17

## 2019-09-17 RX ADMIN — Medication 3 MILLILITER(S): at 08:06

## 2019-09-17 RX ADMIN — CARBIDOPA AND LEVODOPA 1 TABLET(S): 25; 100 TABLET ORAL at 13:36

## 2019-09-17 RX ADMIN — PIPERACILLIN AND TAZOBACTAM 25 GRAM(S): 4; .5 INJECTION, POWDER, LYOPHILIZED, FOR SOLUTION INTRAVENOUS at 21:27

## 2019-09-17 RX ADMIN — Medication 105.4 MILLIGRAM(S): at 05:17

## 2019-09-17 RX ADMIN — SODIUM CHLORIDE 50 MILLILITER(S): 9 INJECTION, SOLUTION INTRAVENOUS at 13:35

## 2019-09-17 RX ADMIN — CARBIDOPA AND LEVODOPA 1 TABLET(S): 25; 100 TABLET ORAL at 21:27

## 2019-09-17 RX ADMIN — PIPERACILLIN AND TAZOBACTAM 25 GRAM(S): 4; .5 INJECTION, POWDER, LYOPHILIZED, FOR SOLUTION INTRAVENOUS at 13:36

## 2019-09-17 RX ADMIN — ENOXAPARIN SODIUM 40 MILLIGRAM(S): 100 INJECTION SUBCUTANEOUS at 13:36

## 2019-09-17 RX ADMIN — Medication 3 MILLILITER(S): at 19:43

## 2019-09-17 NOTE — PROGRESS NOTE ADULT - SUBJECTIVE AND OBJECTIVE BOX
PCP  Subjective:   in bed asleep ,     Objective:   Vital Signs Last 24 Hrs  T(C): 36.4 (09-17-19 @ 04:28), Max: 36.8 (09-16-19 @ 21:05)  T(F): 97.6 (09-17-19 @ 04:28), Max: 98.3 (09-16-19 @ 21:05)  HR: 68 (09-17-19 @ 04:28) (60 - 68)  BP: 132/71 (09-17-19 @ 04:28) (121/63 - 144/73)  BP(mean): --  RR: 17 (09-17-19 @ 04:28) (16 - 18)  SpO2: 100% (09-17-19 @ 04:28) (95% - 100%)  Daily     Daily     GENERAL:  wdwn male , awake , non verbal , has a cough  EYES: open   NECK: general stiffness, on right face and around mouth there is a vesicular rash with some scabbing , and redness ,   CHEST/LUNG: clear  HEART: s1 s2 regular  ABDOMEN:  soft  EXTREMITIES:  no edema   SKIN:  warm   CNS:  advance parkinson's , stiffness, non verbal      Allergies: Allergies    No Known Allergies    Intolerances        Home Medications:  carbidopa-levodopa 10 mg-100 mg oral tablet: 1 tab(s) orally 4 times a day (11 Sep 2019 22:20)  finasteride 5 mg oral tablet: 1 tab(s) orally once a day (11 Sep 2019 22:20)  metoprolol succinate 25 mg oral tablet, extended release: 1 tab(s) orally once a day (11 Sep 2019 22:20)  simvastatin 40 mg oral tablet: 1 tab(s) orally once a day (at bedtime) (11 Sep 2019 22:20)  tamsulosin 0.4 mg oral capsule: 1 cap(s) orally once a day (11 Sep 2019 22:20)  tolterodine 4 mg oral capsule, extended release: 1 cap(s) orally once a day (11 Sep 2019 22:20)    Medications:   acetaminophen  Suppository .. 650 milliGRAM(s) Rectal every 6 hours PRN  acyclovir IVPB 270 milliGRAM(s) IV Intermittent every 12 hours  ALBUTerol/ipratropium for Nebulization. 3 milliLiter(s) Nebulizer three times a day  carbidopa/levodopa  25/100 1 Tablet(s) Oral three times a day  enoxaparin Injectable 40 milliGRAM(s) SubCutaneous daily  piperacillin/tazobactam IVPB.. 3.375 Gram(s) IV Intermittent every 8 hours  sodium chloride 0.45%. 1000 milliLiter(s) IV Continuous <Continuous>      LABS:                        10.2   9.20  )-----------( 127      ( 16 Sep 2019 07:46 )             30.3     09-16    141  |  109<H>  |  12  ----------------------------<  109<H>  3.2<L>   |  23  |  1.20    Ca    8.0<L>      16 Sep 2019 07:47  Mg     1.8     09-15              CAPILLARY BLOOD GLUCOSE              RECENT CULTURES:  Culture Results:   No growth to date. (09-14 @ 19:00)  Culture Results:   No growth to date. (09-14 @ 19:00)  Culture Results:   Growth in aerobic and anaerobic bottles: Enterococcus faecalis and  Klebsiella pneumoniae  See previous culture [id number] (09-12 @ 00:19)  Culture Results:   Growth in aerobic and anaerobic bottles: Enterococcus faecalis and  Klebsiella pneumoniae  "Due to technical problems, Proteus sp. will Not be reported as part of  the BCID panel until further notice" ***Blood Panel PCR results on this  specimen are available  approximately 3 hours after the Gram stain result.***  Gram stain, PCR, and/or culture results may not always  correspond due to difference in methodologies.  ************************************************************  This PCR assay was performed using [employer].  The following targets are tested for: Enterococcus,  vancomycin resistant enterococci, Listeria monocytogenes,  coagulase negative staphylococci, S. aureus,  methicillin resistant S. aureus, Streptococcus agalactiae  (Group B), S. pneumoniae, S. pyogenes (Group A),  Acinetobacter baumannii, Enterobacter cloacae, E. coli,  Klebsiella oxytoca, K. pneumoniae, Proteus sp.,  Serratia marcescens, Haemophilus influenzae,  Neisseria meningitidis, Pseudomonas aeruginosa, Candida  albicans, C. glabrata, C krusei, C parapsilosis,  C. tropicalis and the KPC resistance gene. (09-12 @ 00:19)  Culture Results:   >100,000 CFU/ml Klebsiella pneumoniae (09-12 @ 00:10)        Culture - Blood (collected 09-14-19 @ 19:00)  Source: .Blood Blood-Peripheral  Preliminary Report (09-15-19 @ 19:01):    No growth to date.    Culture - Blood (collected 09-14-19 @ 19:00)  Source: .Blood Blood  Preliminary Report (09-15-19 @ 19:01):    No growth to date.    Culture - Blood (collected 09-12-19 @ 00:19)  Source: .Blood Blood-Peripheral  Gram Stain (09-12-19 @ 14:35):    Growth in aerobic bottle: Gram Positive Cocci in Pairs and Chains    Growth in anaerobic bottle: Gram Positive Cocci in Pairs and Chains and    Gram Negative Rods  Final Report (09-14-19 @ 22:31):    Growth in aerobic and anaerobic bottles: Enterococcus faecalis and    Klebsiella pneumoniae    "Due to technical problems, Proteus sp. will Not be reported as part of    the BCID panel until further notice" ***Blood Panel PCR results on this    specimen are available    approximately 3 hours after the Gram stain result.***    Gram stain, PCR, and/or culture results may not always    correspond due to difference in methodologies.    ************************************************************    This PCR assay was performed using [employer].    The following targets are tested for: Enterococcus,    vancomycin resistant enterococci, Listeria monocytogenes,    coagulase negative staphylococci, S. aureus,    methicillin resistant S. aureus, Streptococcus agalactiae    (Group B), S. pneumoniae, S. pyogenes (Group A),    Acinetobacter baumannii, Enterobacter cloacae, E. coli,    Klebsiella oxytoca, K. pneumoniae, Proteus sp.,    Serratia marcescens, Haemophilus influenzae,    Neisseria meningitidis, Pseudomonas aeruginosa, Candida    albicans, C. glabrata, C krusei, C parapsilosis,    C. tropicalis and the KPC resistance gene.  Organism: Blood Culture PCR  Enterococcus faecalis  Klebsiella pneumoniae (09-14-19 @ 22:31)  Organism: Klebsiella pneumoniae (09-14-19 @ 22:31)      -  Amikacin: S <=16      -  Ampicillin: R >16 These ampicillin results predict results for amoxicillin      -  Ampicillin/Sulbactam: S <=8/4 Enterobacter, Citrobacter, and Serratia may develop resistance during prolonged therapy (3-4 days)      -  Aztreonam: S <=4      -  Cefazolin: S <=8 Enterobacter, Citrobacter, and Serratia may develop resistance during prolonged therapy (3-4 days)      -  Cefepime: S <=4      -  Cefoxitin: S <=8      -  Ceftriaxone: S <=1 Enterobacter, Citrobacter, and Serratia may develop resistance during prolonged therapy      -  Ciprofloxacin: S <=1      -  Gentamicin: S <=4      -  Imipenem: S <=1      -  Levofloxacin: S <=2      -  Meropenem: S <=1      -  Piperacillin/Tazobactam: S <=16      -  Tobramycin: S <=4      -  Trimethoprim/Sulfamethoxazole: S <=2/38      Method Type: FRANK  Organism: Enterococcus faecalis (09-14-19 @ 22:31)      -  Ampicillin: S <=2 Predicts results to ampicillin/sulbactam, amoxacillin-clavulanate and  piperacillin-tazobactam.      -  Gentamicin synergy: S      -  Vancomycin: S 2      Method Type: FRANK  Organism: Blood Culture PCR (09-14-19 @ 22:31)      -  Enterococcus species: Detec      -  Klebsiella pneumoniae: Detec      Method Type: PCR    Culture - Blood (collected 09-12-19 @ 00:19)  Source: .Blood Blood-Peripheral  Gram Stain (09-12-19 @ 16:03):    Growth in anaerobic bottle: Gram Positive Cocci in Pairs and Chains and    Gram Negative Rods    Growth in aerobic bottle: Gram Positive Cocci in Pairs and Chains and    Gram Negative Rods  Final Report (09-14-19 @ 22:36):    Growth in aerobic and anaerobic bottles: Enterococcus faecalis and    Klebsiella pneumoniae    See previous culture [id number]    Culture - Urine (collected 09-12-19 @ 00:10)  Source: .Urine Clean Catch (Midstream)  Final Report (09-13-19 @ 22:54):    >100,000 CFU/ml Klebsiella pneumoniae  Organism: Klebsiella pneumoniae (09-13-19 @ 22:54)  Organism: Klebsiella pneumoniae (09-13-19 @ 22:54)      -  Amikacin: S <=16      -  Ampicillin: R >16 These ampicillin results predict results for amoxicillin      -  Ampicillin/Sulbactam: S <=8/4 Enterobacter, Citrobacter, and Serratia may develop resistance during prolonged therapy (3-4 days)      -  Aztreonam: S <=4      -  Cefazolin: S <=8 ([id number]_[id number]) For uncomplicated UTI with K. pneumoniae, E. coli, or P. mirablis: FRANK <=16 is sensitive and FRANK >=32 is resistant. This also predicts results for oral agents cefaclor, cefdinir, cefpodoxime, cefprozil, cefuroxime axetil, cephalexin and locarbef for uncomplicated UTI. Note that some isolates may be susceptible to these agents while testing resistant to cefazolin.      -  Cefepime: S <=4      -  Cefoxitin: S <=8      -  Ceftriaxone: S <=1 Enterobacter, Citrobacter, and Serratia may develop resistance during prolonged therapy      -  Ciprofloxacin: S <=1      -  Gentamicin: S <=4      -  Imipenem: S <=1      -  Levofloxacin: S <=2      -  Meropenem: S <=1      -  Nitrofurantoin: I 64 Should not be used to treat pyelonephritis      -  Piperacillin/Tazobactam: S <=16      -  Tigecycline: S <=2      -  Tobramycin: S <=4      -  Trimethoprim/Sulfamethoxazole: S <=2/38      Method Type: FRANK

## 2019-09-17 NOTE — PROVIDER CONTACT NOTE (OTHER) - BACKGROUND
pt with pmh of Dementia, Parkinson disease , HTN , admitted from [facility] for hematuria found to have temp 106 rectally in ED received 3L NS Bolus, currently on NS @ 80ml/ hr . Pt is nonverbal
Admitting diagnosis : Sepsis

## 2019-09-17 NOTE — PROGRESS NOTE ADULT - SUBJECTIVE AND OBJECTIVE BOX
[facility], Division of Infectious Diseases  [place] Trent A. Lee  [phone]    Name: KAITLIN LUX  Age: 78y  Gender: Male  MRN: [id number]    Interval History--  Notes reviewed. Awake. Stares. Not verbal or interactive.     Past Medical History--  Benign prostatic hyperplasia with lower urinary tract symptoms, symptom details unspecified  Other hyperlipidemia  Hypertension, unspecified type  Dementia due to Parkinson's disease with behavioral disturbance  Parkinson disease      For details regarding the patient's social history, family history, and other miscellaneous elements, please refer the initial infectious diseases consultation and/or the admitting history and physical examination for this admission.    Allergies    No Known Allergies    Intolerances        Medications--  Antibiotics:  acyclovir IVPB 270 milliGRAM(s) IV Intermittent every 12 hours  piperacillin/tazobactam IVPB.. 3.375 Gram(s) IV Intermittent every 8 hours    Immunologic:    Other:  acetaminophen  Suppository .. PRN  ALBUTerol/ipratropium for Nebulization.  carbidopa/levodopa  25/100  enoxaparin Injectable  morphine   Solution PRN  sodium chloride 0.45%.      Review of Systems--  Review of systems unable due to dementia.     Physical Examination--  Vital Signs: T(F): 98 (09-17-19 @ 13:33), Max: 98.3 (09-16-19 @ 21:05)  HR: 66 (09-17-19 @ 13:33)  BP: 150/77 (09-17-19 @ 13:33)  RR: 18 (09-17-19 @ 13:33)  SpO2: 99% (09-17-19 @ 13:33)  Wt(kg): --  General: Frail, nontoxic-appearing Male in no acute distress.  HEENT: Parkinsonian facies. Bitemporal wasting. Oropharynx/dentition unable secondary to patient compliance. 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 Anicteric. Conjunctiva pink and moist.   Neck: Increased tone not frankly rigid. No sense of mass.  Nodes: None palpable.  Lungs: Poor effort grossly clear bilaterally without rales, wheezing or rhonchi  Heart: Regular rate and rhythm. No Murmur. No rub. No gallop. No palpable thrill.  Abdomen: Bowel sounds present and normoactive. Soft. Nondistended. Nontender. No sense of mass. No organomegaly.  Back: No spinal tenderness. No costovertebral angle tenderness.   Extremities: No cyanosis or clubbing. No edema. Wasted.  Skin: Warm. Dry. Good turgor. No rash. No vasculitic stigmata. Multiple tattoos.   Psychiatric: Unable    Laboratory Studies--  CBC                        10.2   9.20  )-----------( 127      ( 16 Sep 2019 07:46 )             30.3       Chemistries  09-16    141  |  109<H>  |  12  ----------------------------<  109<H>  3.2<L>   |  23  |  1.20    Ca    8.0<L>      16 Sep 2019 07:47    Herpes Simplex Virus 1/2 VZV Lesions, PCR (09.16.19 @ 19:07)    Herpes Simplex Virus 1/2  VZV PCR Source: lesion    Herpes Simplex Virus 1/2  VZV PCR Result: HSV1 Detected HSV 1/2  and VZV assay is a Real-Time PCR test for the qualitative  detection and differentiation of herpes simplex virus type 1 (HSV1), 2  (HSV2) and varicella-zoster virus (VZV) DNA in lesion samples. The result  should be interpretedwith consideration of all clinical and laboratory  findings.        Culture Data    Culture - Blood (collected 14 Sep 2019 19:00)  Source: .Blood Blood-Peripheral  Preliminary Report (15 Sep 2019 19:01):    No growth to date.    Culture - Blood (collected 14 Sep 2019 19:00)  Source: .Blood Blood  Preliminary Report (15 Sep 2019 19:01):    No growth to date.    Culture - Blood (collected 12 Sep 2019 00:19)  Source: .Blood Blood-Peripheral  Gram Stain (12 Sep 2019 14:35):    Growth in aerobic bottle: Gram Positive Cocci in Pairs and Chains    Growth in anaerobic bottle: Gram Positive Cocci in Pairs and Chains and    Gram Negative Rods  Final Report (14 Sep 2019 22:31):    Growth in aerobic and anaerobic bottles: Enterococcus faecalis and    Klebsiella pneumoniae    "Due to technical problems, Proteus sp. will Not be reported as part of    the BCID panel until further notice" ***Blood Panel PCR results on this    specimen are available    approximately 3 hours after the Gram stain result.***    Gram stain, PCR, and/or culture results may not always    correspond due to difference in methodologies.    ************************************************************    This PCR assay was performed using [employer].    The following targets are tested for: Enterococcus,    vancomycin resistant enterococci, Listeria monocytogenes,    coagulase negative staphylococci, S. aureus,    methicillin resistant S. aureus, Streptococcus agalactiae    (Group B), S. pneumoniae, S. pyogenes (Group A),    Acinetobacter baumannii, Enterobacter cloacae, E. coli,    Klebsiella oxytoca, K. pneumoniae, Proteus sp.,    Serratia marcescens, Haemophilus influenzae,    Neisseria meningitidis, Pseudomonas aeruginosa, Candida    albicans, C. glabrata, C krusei, C parapsilosis,    C. tropicalis and the KPC resistance gene.  Organism: Blood Culture PCR  Enterococcus faecalis  Klebsiella pneumoniae (14 Sep 2019 22:31)  Organism: Klebsiella pneumoniae (14 Sep 2019 22:31)  Organism: Enterococcus faecalis (14 Sep 2019 22:31)  Organism: Blood Culture PCR (14 Sep 2019 22:31)    Culture - Blood (collected 12 Sep 2019 00:19)  Source: .Blood Blood-Peripheral  Gram Stain (12 Sep 2019 16:03):    Growth in anaerobic bottle: Gram Positive Cocci in Pairs and Chains and    Gram Negative Rods    Growth in aerobic bottle: Gram Positive Cocci in Pairs and Chains and    Gram Negative Rods  Final Report (14 Sep 2019 22:36):    Growth in aerobic and anaerobic bottles: Enterococcus faecalis and    Klebsiella pneumoniae    See previous culture [id number]    Culture - Urine (collected 12 Sep 2019 00:10)  Source: .Urine Clean Catch (Midstream)  Final Report (13 Sep 2019 22:54):    >100,000 CFU/ml Klebsiella pneumoniae  Organism: Klebsiella pneumoniae (13 Sep 2019 22:54)  Organism: Klebsiella pneumoniae (13 Sep 2019 22:54)

## 2019-09-17 NOTE — PROGRESS NOTE ADULT - SUBJECTIVE AND OBJECTIVE BOX
Neurology follow up note    KAITLIN LUX78yMale      Interval History:    Patient resting in bed     MEDICATIONS    acetaminophen  Suppository .. 650 milliGRAM(s) Rectal every 6 hours PRN  acyclovir IVPB 270 milliGRAM(s) IV Intermittent every 12 hours  ALBUTerol/ipratropium for Nebulization. 3 milliLiter(s) Nebulizer three times a day  carbidopa/levodopa  25/100 1 Tablet(s) Oral three times a day  enoxaparin Injectable 40 milliGRAM(s) SubCutaneous daily  morphine   Solution 2 milliGRAM(s) Oral every 2 hours PRN  piperacillin/tazobactam IVPB.. 3.375 Gram(s) IV Intermittent every 8 hours  sodium chloride 0.45%. 1000 milliLiter(s) IV Continuous <Continuous>      Allergies    No Known Allergies    Intolerances            Vital Signs Last 24 Hrs  T(C): 36.4 (17 Sep 2019 08:39), Max: 36.8 (16 Sep 2019 21:05)  T(F): 97.5 (17 Sep 2019 08:39), Max: 98.3 (16 Sep 2019 21:05)  HR: 80 (17 Sep 2019 08:39) (60 - 80)  BP: 127/61 (17 Sep 2019 08:39) (127/61 - 144/73)  BP(mean): --  RR: 18 (17 Sep 2019 08:39) (17 - 18)  SpO2: 100% (17 Sep 2019 08:39) (95% - 100%)    REVIEW OF SYSTEMS: Non Verbal  Limited or unable to obtain secondary to patient's poor mental status.    NEUROLOGIC:  The patient was resting in bed     The patient will not follow any commands.  Unable to gaze extraocular movements, but positive blink to bilateral visual threat.    The patient has positive masked face.    The patient is nonverbal.    Motor:  Elevated bilateral upper extremities.  The patient was able to maintain elevated position, so would say 4/5.    Positive resting tremors were noted.    Positive cogwheel rigidity was noted.    Positive bradykinesia was noted in the upper extremities.    Bilateral lower extremities had significant tone.  Slight flexation at the hip and knee.  As per my conversation with son, it appears that the patient is mostly wheelchair bound.      GENERAL Exam: Nontoxic , No Acute Distress   	  HEENT:   face mouth lesion suspect HSV  		  LUNGS:  Decreased bilaterally  	  HEART: Normal S1S2   No murmur RRR        	  GI/ ABDOMEN:  Soft  Non tender    EXTREMITIES:   No Edema  No Clubbing  No Cyanosis   	   SKIN: Normal  No Ecchymosis               LABS:  CBC Full  -  ( 16 Sep 2019 07:46 )  WBC Count : 9.20 K/uL  RBC Count : 3.34 M/uL  Hemoglobin : 10.2 g/dL  Hematocrit : 30.3 %  Platelet Count - Automated : 127 K/uL  Mean Cell Volume : 90.7 fl  Mean Cell Hemoglobin : 30.5 pg  Mean Cell Hemoglobin Concentration : 33.7 gm/dL  Auto Neutrophil # : x  Auto Lymphocyte # : x  Auto Monocyte # : x  Auto Eosinophil # : x  Auto Basophil # : x  Auto Neutrophil % : x  Auto Lymphocyte % : x  Auto Monocyte % : x  Auto Eosinophil % : x  Auto Basophil % : x      09-16    141  |  109<H>  |  12  ----------------------------<  109<H>  3.2<L>   |  23  |  1.20    Ca    8.0<L>      16 Sep 2019 07:47      Hemoglobin A1C:       Vitamin B12         RADIOLOGY    ANALYSIS AND PLAN:  A 78-year-old with history of dementia and Parkinson's.  1.	For history of dementia, most likely this is very advanced.  I do not feel that adding medications will be of any benefit for the patient.  2.	For history of underlying Parkinson's, suspect the patient does have Parkinson's exacerbation, which is multifactorial secondary to underlying infectious type process and dehydration.  For now, I will continue the patient on his current dose of Sinemet.  Once the patient's general medical issues are rectified, if the patient still appears to have significant symptoms of Parkinson's, I would recommend consideration of increasing the patient's Parkinson's medications to 37.5 mg three times a day and adjust accordingly.  3.	I would recommend fall precautions.  4.	Monitor oral intake now NPO  5.	HSV ID follow antibiotics as needed   6.	I spoke with son, Armando at  bedside 9/16/19 [phone].  He understands my reasoning and thought process.  7.	prognosis guarded -- spoke to son possible hospice which is reasonable   8.	Greater than 45 minutes of time was spent on the patient, plan of care, reviewing data, speaking to family and multidisciplinary healthcare team.    Thank you for the courtesy of consultation.

## 2019-09-18 RX ORDER — MORPHINE SULFATE 50 MG/1
1 CAPSULE, EXTENDED RELEASE ORAL
Qty: 0 | Refills: 0 | DISCHARGE
Start: 2019-09-18

## 2019-09-18 RX ORDER — TOLTERODINE TARTRATE 1 MG/1
1 TABLET, FILM COATED ORAL
Qty: 0 | Refills: 0 | DISCHARGE

## 2019-09-18 RX ORDER — SIMVASTATIN 20 MG/1
1 TABLET, FILM COATED ORAL
Qty: 0 | Refills: 0 | DISCHARGE

## 2019-09-18 RX ORDER — FINASTERIDE 5 MG/1
1 TABLET, FILM COATED ORAL
Qty: 0 | Refills: 0 | DISCHARGE

## 2019-09-18 RX ORDER — CARBIDOPA AND LEVODOPA 25; 100 MG/1; MG/1
1 TABLET ORAL
Qty: 0 | Refills: 0 | DISCHARGE

## 2019-09-18 RX ORDER — METOPROLOL TARTRATE 50 MG
1 TABLET ORAL
Qty: 0 | Refills: 0 | DISCHARGE

## 2019-09-18 RX ORDER — TAMSULOSIN HYDROCHLORIDE 0.4 MG/1
1 CAPSULE ORAL
Qty: 0 | Refills: 0 | DISCHARGE

## 2019-09-18 RX ADMIN — MORPHINE SULFATE 2 MILLIGRAM(S): 50 CAPSULE, EXTENDED RELEASE ORAL at 11:30

## 2019-09-18 RX ADMIN — MORPHINE SULFATE 2 MILLIGRAM(S): 50 CAPSULE, EXTENDED RELEASE ORAL at 10:51

## 2019-09-18 RX ADMIN — ENOXAPARIN SODIUM 40 MILLIGRAM(S): 100 INJECTION SUBCUTANEOUS at 13:39

## 2019-09-18 RX ADMIN — PIPERACILLIN AND TAZOBACTAM 25 GRAM(S): 4; .5 INJECTION, POWDER, LYOPHILIZED, FOR SOLUTION INTRAVENOUS at 05:04

## 2019-09-18 RX ADMIN — PIPERACILLIN AND TAZOBACTAM 25 GRAM(S): 4; .5 INJECTION, POWDER, LYOPHILIZED, FOR SOLUTION INTRAVENOUS at 13:39

## 2019-09-18 RX ADMIN — Medication 3 MILLILITER(S): at 07:28

## 2019-09-18 RX ADMIN — Medication 105.4 MILLIGRAM(S): at 17:33

## 2019-09-18 RX ADMIN — Medication 3 MILLILITER(S): at 13:57

## 2019-09-18 RX ADMIN — Medication 3 MILLILITER(S): at 19:35

## 2019-09-18 RX ADMIN — PIPERACILLIN AND TAZOBACTAM 25 GRAM(S): 4; .5 INJECTION, POWDER, LYOPHILIZED, FOR SOLUTION INTRAVENOUS at 21:21

## 2019-09-18 RX ADMIN — CARBIDOPA AND LEVODOPA 1 TABLET(S): 25; 100 TABLET ORAL at 21:21

## 2019-09-18 RX ADMIN — Medication 105.4 MILLIGRAM(S): at 05:04

## 2019-09-18 RX ADMIN — CARBIDOPA AND LEVODOPA 1 TABLET(S): 25; 100 TABLET ORAL at 05:04

## 2019-09-18 RX ADMIN — CARBIDOPA AND LEVODOPA 1 TABLET(S): 25; 100 TABLET ORAL at 13:39

## 2019-09-18 NOTE — PROGRESS NOTE ADULT - SUBJECTIVE AND OBJECTIVE BOX
[facility], Division of Infectious Diseases  [place] A. Lee  [phone]    Name: KAITLIN LUX  Age: 78y  Gender: Male  MRN: [id number]    Interval History--  Notes reviewed. Remains nonverbal and noninteractive unchanged.     Past Medical History--  Benign prostatic hyperplasia with lower urinary tract symptoms, symptom details unspecified  Other hyperlipidemia  Hypertension, unspecified type  Dementia due to Parkinson's disease with behavioral disturbance  Parkinson disease      For details regarding the patient's social history, family history, and other miscellaneous elements, please refer the initial infectious diseases consultation and/or the admitting history and physical examination for this admission.    Allergies    No Known Allergies    Intolerances        Medications--  Antibiotics:  acyclovir IVPB 270 milliGRAM(s) IV Intermittent every 12 hours  piperacillin/tazobactam IVPB.. 3.375 Gram(s) IV Intermittent every 8 hours    Immunologic:    Other:  acetaminophen  Suppository .. PRN  ALBUTerol/ipratropium for Nebulization.  carbidopa/levodopa  25/100  enoxaparin Injectable  morphine   Solution PRN  sodium chloride 0.45%.      Review of Systems--  Review of systems unable due to dementia.     Physical Examination--  Vital Signs: T(F): 98.2 (09-18-19 @ 08:30), Max: 98.5 (09-17-19 @ 15:45)  HR: 89 (09-18-19 @ 08:30)  BP: 146/75 (09-18-19 @ 08:30)  RR: 18 (09-18-19 @ 08:30)  SpO2: 98% (09-18-19 @ 08:30)  Wt(kg): --  General: Frail, nontoxic-appearing Male in no acute distress.  HEENT: Parkinsonian facies. Bitemporal wasting. Oropharynx/dentition unable secondary to patient compliance. HSV changes crusted. Anicteric. Conjunctiva pink and moist.   Neck: Increased tone not frankly rigid. No sense of mass.  Nodes: None palpable.  Lungs: Poor effort grossly clear bilaterally without rales, wheezing or rhonchi  Heart: Regular rate and rhythm. No Murmur. No rub. No gallop. No palpable thrill.  Abdomen: Bowel sounds present and normoactive. Soft. Nondistended. Nontender. No sense of mass. No organomegaly.  Back: No spinal tenderness. No costovertebral angle tenderness.   Extremities: No cyanosis or clubbing. No edema. Wasted.  Skin: Warm. Dry. Good turgor. No rash. No vasculitic stigmata. Multiple tattoos.   Psychiatric: Unable      Laboratory Studies--  CBC      Chemistries          Culture Data    Culture - Blood (collected 14 Sep 2019 19:00)  Source: .Blood Blood-Peripheral  Preliminary Report (15 Sep 2019 19:01):    No growth to date.    Culture - Blood (collected 14 Sep 2019 19:00)  Source: .Blood Blood  Preliminary Report (15 Sep 2019 19:01):    No growth to date.    Culture - Blood (collected 12 Sep 2019 00:19)  Source: .Blood Blood-Peripheral  Gram Stain (12 Sep 2019 14:35):    Growth in aerobic bottle: Gram Positive Cocci in Pairs and Chains    Growth in anaerobic bottle: Gram Positive Cocci in Pairs and Chains and    Gram Negative Rods  Final Report (14 Sep 2019 22:31):    Growth in aerobic and anaerobic bottles: Enterococcus faecalis and    Klebsiella pneumoniae    "Due to technical problems, Proteus sp. will Not be reported as part of    the BCID panel until further notice" ***Blood Panel PCR results on this    specimen are available    approximately 3 hours after the Gram stain result.***    Gram stain, PCR, and/or culture results may not always    correspond due to difference in methodologies.    ************************************************************    This PCR assay was performed using [employer].    The following targets are tested for: Enterococcus,    vancomycin resistant enterococci, Listeria monocytogenes,    coagulase negative staphylococci, S. aureus,    methicillin resistant S. aureus, Streptococcus agalactiae    (Group B), S. pneumoniae, S. pyogenes (Group A),    Acinetobacter baumannii, Enterobacter cloacae, E. coli,    Klebsiella oxytoca, K. pneumoniae, Proteus sp.,    Serratia marcescens, Haemophilus influenzae,    Neisseria meningitidis, Pseudomonas aeruginosa, Candida    albicans, C. glabrata, C krusei, C parapsilosis,    C. tropicalis and the KPC resistance gene.  Organism: Blood Culture PCR  Enterococcus faecalis  Klebsiella pneumoniae (14 Sep 2019 22:31)  Organism: Klebsiella pneumoniae (14 Sep 2019 22:31)  Organism: Enterococcus faecalis (14 Sep 2019 22:31)  Organism: Blood Culture PCR (14 Sep 2019 22:31)    Culture - Blood (collected 12 Sep 2019 00:19)  Source: .Blood Blood-Peripheral  Gram Stain (12 Sep 2019 16:03):    Growth in anaerobic bottle: Gram Positive Cocci in Pairs and Chains and    Gram Negative Rods    Growth in aerobic bottle: Gram Positive Cocci in Pairs and Chains and    Gram Negative Rods  Final Report (14 Sep 2019 22:36):    Growth in aerobic and anaerobic bottles: Enterococcus faecalis and    Klebsiella pneumoniae    See previous culture [id number]    Culture - Urine (collected 12 Sep 2019 00:10)  Source: .Urine Clean Catch (Midstream)  Final Report (13 Sep 2019 22:54):    >100,000 CFU/ml Klebsiella pneumoniae  Organism: Klebsiella pneumoniae (13 Sep 2019 22:54)  Organism: Klebsiella pneumoniae (13 Sep 2019 22:54)

## 2019-09-18 NOTE — PROGRESS NOTE ADULT - SUBJECTIVE AND OBJECTIVE BOX
PCP  Subjective:   in bed , non verbal     Objective:   Vital Signs Last 24 Hrs  T(C): 37.1 (19 @ 19:23), Max: 37.1 (19 @ 15:18)  T(F): 98.7 (19 @ 19:23), Max: 98.8 (19 @ 15:18)  HR: 76 (19 @ 19:41) (66 - 89)  BP: 156/81 (19 @ 19:23) (146/68 - 161/75)  BP(mean): --  RR: 18 (19 @ 19:23) (18 - 20)  SpO2: 98% (19 @ 19:41) (98% - 100%)  Daily     Daily Weight in k.2 (18 Sep 2019 04:50)     GENERAL:  wdwn male , awake , non verbal , has a cough  EYES: open   NECK: general stiffness, on right face and around mouth there is a vesicular rash with some scabbing , and redness ,   CHEST/LUNG: clear  HEART: s1 s2 regular  ABDOMEN:  soft  EXTREMITIES:  no edema   SKIN:  warm   CNS:  advance parkinson's , stiffness, non verbal      Allergies: Allergies    No Known Allergies    Intolerances        Home Medications:  carbidopa-levodopa 10 mg-100 mg oral tablet: 1 tab(s) orally 4 times a day (11 Sep 2019 22:20)  finasteride 5 mg oral tablet: 1 tab(s) orally once a day (11 Sep 2019 22:20)  metoprolol succinate 25 mg oral tablet, extended release: 1 tab(s) orally once a day (11 Sep 2019 22:20)  simvastatin 40 mg oral tablet: 1 tab(s) orally once a day (at bedtime) (11 Sep 2019 22:20)  tamsulosin 0.4 mg oral capsule: 1 cap(s) orally once a day (11 Sep 2019 22:20)  tolterodine 4 mg oral capsule, extended release: 1 cap(s) orally once a day (11 Sep 2019 22:20)    Medications:   acetaminophen  Suppository .. 650 milliGRAM(s) Rectal every 6 hours PRN  acyclovir IVPB 270 milliGRAM(s) IV Intermittent every 12 hours  ALBUTerol/ipratropium for Nebulization. 3 milliLiter(s) Nebulizer three times a day  carbidopa/levodopa  25/100 1 Tablet(s) Oral three times a day  enoxaparin Injectable 40 milliGRAM(s) SubCutaneous daily  morphine   Solution 2 milliGRAM(s) Oral every 2 hours PRN  piperacillin/tazobactam IVPB.. 3.375 Gram(s) IV Intermittent every 8 hours  sodium chloride 0.45%. 1000 milliLiter(s) IV Continuous <Continuous>      LABS:                  CAPILLARY BLOOD GLUCOSE              RECENT CULTURES:  Culture Results:   No growth to date. ( @ :)  Culture Results:   No growth to date. (:)  Culture Results:   Growth in aerobic and anaerobic bottles: Enterococcus faecalis and  Klebsiella pneumoniae  See previous culture [id number] (:)  Culture Results:   Growth in aerobic and anaerobic bottles: Enterococcus faecalis and  Klebsiella pneumoniae  "Due to technical problems, Proteus sp. will Not be reported as part of  the BCID panel until further notice" ***Blood Panel PCR results on this  specimen are available  approximately 3 hours after the Gram stain result.***  Gram stain, PCR, and/or culture results may not always  correspond due to difference in methodologies.  ************************************************************  This PCR assay was performed using [employer].  The following targets are tested for: Enterococcus,  vancomycin resistant enterococci, Listeria monocytogenes,  coagulase negative staphylococci, S. aureus,  methicillin resistant S. aureus, Streptococcus agalactiae  (Group B), S. pneumoniae, S. pyogenes (Group A),  Acinetobacter baumannii, Enterobacter cloacae, E. coli,  Klebsiella oxytoca, K. pneumoniae, Proteus sp.,  Serratia marcescens, Haemophilus influenzae,  Neisseria meningitidis, Pseudomonas aeruginosa, Candida  albicans, C. glabrata, C krusei, C parapsilosis,  C. tropicalis and the KPC resistance gene. ( @ :19)  Culture Results:   >100,000 CFU/ml Klebsiella pneumoniae ( @ 00:10)        Culture - Blood (collected 19 @ 19:00)  Source: .Blood Blood-Peripheral  Preliminary Report (09-15-19 @ 19:01):    No growth to date.    Culture - Blood (collected 19 @ 19:00)  Source: .Blood Blood  Preliminary Report (09-15-19 @ 19:01):    No growth to date.    Culture - Blood (collected 19 @ 00:19)  Source: .Blood Blood-Peripheral  Gram Stain (19 @ 14:35):    Growth in aerobic bottle: Gram Positive Cocci in Pairs and Chains    Growth in anaerobic bottle: Gram Positive Cocci in Pairs and Chains and    Gram Negative Rods  Final Report (19 @ 22:31):    Growth in aerobic and anaerobic bottles: Enterococcus faecalis and    Klebsiella pneumoniae    "Due to technical problems, Proteus sp. will Not be reported as part of    the BCID panel until further notice" ***Blood Panel PCR results on this    specimen are available    approximately 3 hours after the Gram stain result.***    Gram stain, PCR, and/or culture results may not always    correspond due to difference in methodologies.    ************************************************************    This PCR assay was performed using [employer].    The following targets are tested for: Enterococcus,    vancomycin resistant enterococci, Listeria monocytogenes,    coagulase negative staphylococci, S. aureus,    methicillin resistant S. aureus, Streptococcus agalactiae    (Group B), S. pneumoniae, S. pyogenes (Group A),    Acinetobacter baumannii, Enterobacter cloacae, E. coli,    Klebsiella oxytoca, K. pneumoniae, Proteus sp.,    Serratia marcescens, Haemophilus influenzae,    Neisseria meningitidis, Pseudomonas aeruginosa, Candida    albicans, C. glabrata, C krusei, C parapsilosis,    C. tropicalis and the KPC resistance gene.  Organism: Blood Culture PCR  Enterococcus faecalis  Klebsiella pneumoniae (19 @ 22:31)  Organism: Klebsiella pneumoniae (19 22:31)      -  Amikacin: S <=16      -  Ampicillin: R >16 These ampicillin results predict results for amoxicillin      -  Ampicillin/Sulbactam: S <=8/4 Enterobacter, Citrobacter, and Serratia may develop resistance during prolonged therapy (3-4 days)      -  Aztreonam: S <=4      -  Cefazolin: S <=8 Enterobacter, Citrobacter, and Serratia may develop resistance during prolonged therapy (3-4 days)      -  Cefepime: S <=4      -  Cefoxitin: S <=8      -  Ceftriaxone: S <=1 Enterobacter, Citrobacter, and Serratia may develop resistance during prolonged therapy      -  Ciprofloxacin: S <=1      -  Gentamicin: S <=4      -  Imipenem: S <=1      -  Levofloxacin: S <=2      -  Meropenem: S <=1      -  Piperacillin/Tazobactam: S <=16      -  Tobramycin: S <=4      -  Trimethoprim/Sulfamethoxazole: S <=2/38      Method Type: FRANK  Organism: Enterococcus faecalis (19 @ 22:31)      -  Ampicillin: S <=2 Predicts results to ampicillin/sulbactam, amoxacillin-clavulanate and  piperacillin-tazobactam.      -  Gentamicin synergy: S      -  Vancomycin: S 2      Method Type: FRANK  Organism: Blood Culture PCR (19 @ 22:31)      -  Enterococcus species: Detec      -  Klebsiella pneumoniae: Detec      Method Type: PCR    Culture - Blood (collected 19 @ 00:19)  Source: .Blood Blood-Peripheral  Gram Stain (19 @ 16:03):    Growth in anaerobic bottle: Gram Positive Cocci in Pairs and Chains and    Gram Negative Rods    Growth in aerobic bottle: Gram Positive Cocci in Pairs and Chains and    Gram Negative Rods  Final Report (19 @ 22:36):    Growth in aerobic and anaerobic bottles: Enterococcus faecalis and    Klebsiella pneumoniae    See previous culture [id number]    Culture - Urine (collected 19 @ 00:10)  Source: .Urine Clean Catch (Midstream)  Final Report (19 @ 22:54):    >100,000 CFU/ml Klebsiella pneumoniae  Organism: Klebsiella pneumoniae (19 @ 22:54)  Organism: Klebsiella pneumoniae (19 @ 22:54)      -  Amikacin: S <=16      -  Ampicillin: R >16 These ampicillin results predict results for amoxicillin      -  Ampicillin/Sulbactam: S <=8/4 Enterobacter, Citrobacter, and Serratia may develop resistance during prolonged therapy (3-4 days)      -  Aztreonam: S <=4      -  Cefazolin: S <=8 (MIC_CL_COM_ENTERIC_CEFAZU) For uncomplicated UTI with K. pneumoniae, E. coli, or P. mirablis: FRANK <=16 is sensitive and FRANK >=32 is resistant. This also predicts results for oral agents cefaclor, cefdinir, cefpodoxime, cefprozil, cefuroxime axetil, cephalexin and locarbef for uncomplicated UTI. Note that some isolates may be susceptible to these agents while testing resistant to cefazolin.      -  Cefepime: S <=4      -  Cefoxitin: S <=8      -  Ceftriaxone: S <=1 Enterobacter, Citrobacter, and Serratia may develop resistance during prolonged therapy      -  Ciprofloxacin: S <=1      -  Gentamicin: S <=4      -  Imipenem: S <=1      -  Levofloxacin: S <=2      -  Meropenem: S <=1      -  Nitrofurantoin: I 64 Should not be used to treat pyelonephritis      -  Piperacillin/Tazobactam: S <=16      -  Tigecycline: S <=2      -  Tobramycin: S <=4      -  Trimethoprim/Sulfamethoxazole: S <=2/38      Method Type: FRANK

## 2019-09-18 NOTE — PROGRESS NOTE ADULT - ATTENDING COMMENTS
d/w family at bedside and answered questions
D/W RN    Jv Sorensen MD  [phone]
D/W infection control  D/W RN  D/W nurse manager    Jv Sorensen MD  [phone]

## 2019-09-18 NOTE — DISCHARGE NOTE PROVIDER - HOSPITAL COURSE
Assessment and Plan:     · Assessment        admit for urosepsis , and get ID and gu eval , hydrate , iv abx ,     dnr and dni , d/w son     on 9/12/19 sepsis , ? urosepsis , iv abx , bacteremia , poor po intake , dehydration , iv fluids , tylenol ,      patient dnr dni , neuro and ID consults ,     on 9/16/19 get another speech eval , and on iv abx , for urosepsis , prognosis is poor , but since cmo may be we acn do pleasure feeding     on 9/17/19 patient with advance parkinsons , , urosepsis , and pneumonia on cxr , on zosyn , , now with vesicular rash on face , possible zoster placed on acyclovir , on iv fluids , and has chronic foly ,     over all prognosis is poor and patient being evaluated by hospice , he is dnr and dni ,     on 9/18/19 case d/w son , and staff agree to CMO , stop abx , and keep patient comfortable and peaceful, and pain free and with hospice at the facility ,      plan dc to  rehab , LTC with hospice in am          Problem/Plan - 1:    ·  Problem: Sepsis, due to unspecified organism.  Plan: continue  antibiotics folllow labs.          Problem/Plan - 2:    ·  Problem: Dementia due to Parkinson's disease with behavioral disturbance.  Plan: continue meds.          Problem/Plan - 3:    ·  Problem: Parkinson disease.  Plan: continue meds.          Problem/Plan - 4:    ·  Problem: Benign prostatic hyperplasia with lower urinary tract symptoms, symptom details unspecified.  Plan: continue edmondson.          Problem/Plan - 5:    ·  Problem: Parkinson disease.  Plan: sinemet when po. Assessment and Plan:     · Assessment        admit for urosepsis , and get ID and gu eval , hydrate , iv abx ,     dnr and dni , d/w son     on 9/12/19 sepsis , ? urosepsis , iv abx , bacteremia , poor po intake , dehydration , iv fluids , tylenol ,      patient dnr dni , neuro and ID consults ,     on 9/16/19 get another speech eval , and on iv abx , for urosepsis , prognosis is poor , but since cmo may be we acn do pleasure feeding     on 9/17/19 patient with advance parkinsons , , urosepsis , and pneumonia on cxr , on zosyn , , now with vesicular rash on face , possible zoster placed on acyclovir , on iv fluids , and has chronic foly ,     over all prognosis is poor and patient being evaluated by hospice , he is dnr and dni ,     on 9/18/19 case d/w son , and staff agree to CMO , stop abx , and keep patient comfortable and peaceful, and pain free and with hospice at the facility ,      plan dc to  rehab , LTC with hospice in am          Problem/Plan - 1:    ·  Problem: Sepsis, due to unspecified organism.  Plan: continue  antibiotics folllow labs.          Problem/Plan - 2:    ·  Problem: Dementia due to Parkinson's disease with behavioral disturbance.  Plan: continue meds.          Problem/Plan - 3:    ·  Problem: Parkinson disease.  Plan: continue meds.          Problem/Plan - 4:    ·  Problem: Benign prostatic hyperplasia with lower urinary tract symptoms, symptom details unspecified.  Plan: continue edmondson.          Problem/Plan - 5:    ·  Problem: Parkinson disease.  Plan: sinemet when po.         on 9/19/19 patient stable , spoke to dtr , and son , plan dc to rehab with hospice care and CMO care and comfort and keep pain free as best as possible ,     and po feeds with pleasure feeds , as tolerated

## 2019-09-18 NOTE — DISCHARGE NOTE PROVIDER - NSDCADMDATE_GEN_ALL_CORE_FT
11-Sep-2019 22:09
Detail Level: Zone
Other (Free Text): Patient advised that swelling is to be expected. Dr. Strauss states he is healing well.

## 2019-09-18 NOTE — DISCHARGE NOTE PROVIDER - CARE PROVIDER_API CALL
Kp Chavez)  Internal Medicine  [address], [address]  [place] [address]  Phone: [phone]  Fax: [phone]  Follow Up Time:

## 2019-09-18 NOTE — PROGRESS NOTE ADULT - SUBJECTIVE AND OBJECTIVE BOX
Neurology follow up note    KAITLIN [id number]      Interval History:    Patient resting in bed     MEDICATIONS    acetaminophen  Suppository .. 650 milliGRAM(s) Rectal every 6 hours PRN  acyclovir IVPB 270 milliGRAM(s) IV Intermittent every 12 hours  ALBUTerol/ipratropium for Nebulization. 3 milliLiter(s) Nebulizer three times a day  carbidopa/levodopa  25/100 1 Tablet(s) Oral three times a day  enoxaparin Injectable 40 milliGRAM(s) SubCutaneous daily  morphine   Solution 2 milliGRAM(s) Oral every 2 hours PRN  piperacillin/tazobactam IVPB.. 3.375 Gram(s) IV Intermittent every 8 hours  sodium chloride 0.45%. 1000 milliLiter(s) IV Continuous <Continuous>      Allergies    No Known Allergies    Intolerances            Vital Signs Last 24 Hrs  T(C): 36.8 (18 Sep 2019 08:30), Max: 36.9 (17 Sep 2019 15:45)  T(F): 98.2 (18 Sep 2019 08:30), Max: 98.5 (17 Sep 2019 15:45)  HR: 89 (18 Sep 2019 08:30) (66 - 97)  BP: 146/75 (18 Sep 2019 08:30) (143/68 - 159/65)  BP(mean): --  RR: 18 (18 Sep 2019 08:30) (18 - 20)  SpO2: 98% (18 Sep 2019 08:30) (95% - 100%)        REVIEW OF SYSTEMS: Non Verbal  Limited or unable to obtain secondary to patient's poor mental status.    NEUROLOGIC:  The patient was resting in bed     The patient will not follow any commands.  Unable to gaze extraocular movements, but positive blink to bilateral visual threat.    The patient has positive masked face.    The patient is nonverbal.    Motor:  Elevated bilateral upper extremities.  The patient was able to maintain elevated position, so would say 4/5.    Positive resting tremors were noted.    Positive cogwheel rigidity was noted.    Positive bradykinesia was noted in the upper extremities.    Bilateral lower extremities had significant tone.  Slight flexation at the hip and knee.  As per my conversation with son, it appears that the patient is mostly wheelchair bound.      GENERAL Exam: Nontoxic , No Acute Distress   	  HEENT:   face mouth lesion suspect HSV  		  LUNGS:  Decreased bilaterally  	  HEART: Normal S1S2   No murmur RRR        	  GI/ ABDOMEN:  Soft  Non tender    EXTREMITIES:   No Edema  No Clubbing  No Cyanosis   	   SKIN: Normal  No Ecchymosis           LABS:            Hemoglobin A1C:       Vitamin B12         RADIOLOGY      ANALYSIS AND PLAN:  A 78-year-old with history of dementia and Parkinson's.  1.	For history of dementia, most likely this is very advanced.  I do not feel that adding medications will be of any benefit for the patient.  2.	For history of underlying Parkinson's, suspect the patient does have Parkinson's exacerbation, which is multifactorial secondary to underlying infectious type process and dehydration.  For now, I will continue the patient on his current dose of Sinemet. y.  3.	I would recommend fall precautions.  4.	Monitor oral intake if possible  5.	HSV ID follow antibiotics as needed   6.	I spoke with son, Armando  9/17/19 [phone].  He understands my reasoning and thought process.  7.	prognosis guarded -- spoke to son possible hospice which is reasonable   8.	pain medications as needed   9.	Greater than 40 minutes of time was spent on the patient, plan of care, reviewing data, speaking to family and multidisciplinary healthcare team.    Thank you for the courtesy of consultation.

## 2019-09-18 NOTE — DISCHARGE NOTE PROVIDER - NSDCCPCAREPLAN_GEN_ALL_CORE_FT
PRINCIPAL DISCHARGE DIAGNOSIS  Diagnosis: Sepsis, due to unspecified organism  Assessment and Plan of Treatment:       SECONDARY DISCHARGE DIAGNOSES  Diagnosis: Fever 106 degrees F or over  Assessment and Plan of Treatment:

## 2019-09-19 VITALS
RESPIRATION RATE: 18 BRPM | SYSTOLIC BLOOD PRESSURE: 147 MMHG | TEMPERATURE: 98 F | HEART RATE: 61 BPM | DIASTOLIC BLOOD PRESSURE: 70 MMHG | OXYGEN SATURATION: 100 %

## 2019-09-19 LAB
CULTURE RESULTS: SIGNIFICANT CHANGE UP
CULTURE RESULTS: SIGNIFICANT CHANGE UP
SPECIMEN SOURCE: SIGNIFICANT CHANGE UP
SPECIMEN SOURCE: SIGNIFICANT CHANGE UP

## 2019-09-19 RX ADMIN — PIPERACILLIN AND TAZOBACTAM 25 GRAM(S): 4; .5 INJECTION, POWDER, LYOPHILIZED, FOR SOLUTION INTRAVENOUS at 05:14

## 2019-09-19 RX ADMIN — CARBIDOPA AND LEVODOPA 1 TABLET(S): 25; 100 TABLET ORAL at 05:14

## 2019-09-19 RX ADMIN — Medication 105.4 MILLIGRAM(S): at 05:14

## 2019-09-19 NOTE — PROGRESS NOTE ADULT - PROBLEM SELECTOR PROBLEM 1
Sepsis, due to unspecified organism
Polymicrobial bacterial infection
Polymicrobial bacterial infection
Sepsis, due to unspecified organism
Polymicrobial bacterial infection
Sepsis, due to unspecified organism

## 2019-09-19 NOTE — PROGRESS NOTE ADULT - ASSESSMENT
79 yo male admitted with Polymicrobial septicemia, potentially of urinary origin given pyuria  HSV stomatitis  This does not require contact precautions.   9/14 blood cultures-NGTD  Patient remains NPO.   If he again fails S/LP evaluation at this point in time the HSV could easily be a contributing factor, though he would likely fail anyway.
79 yo male admitted with Polymicrobial septicemia, potentially of urinary origin given pyuria and facial rash with concern for HSV  9/14 blood cultures-NGTD
admit for urosepsis , and get ID and gu eval , hydrate , iv abx ,   dnr and dni , d/w son   on 9/12/19 sepsis , ? urosepsis , iv abx , bacteremia , poor po intake , dehydration , iv fluids , tylenol ,    patient dnr dni , neuro and ID consults ,
77 yo male admitted with Polymicrobial septicemia, potentially of urinary origin given pyuria  HSV stomatitis  9/14 blood cultures-NGTD  9/19-decision to move to comfort care and dc abx.
79 yo male admitted with Polymicrobial septicemia, potentially of urinary origin given pyuria  HSV stomatitis  This does not require contact precautions.   9/14 blood cultures-NGTD
Polymicrobial septicemia, likely urinary origin given pyuria,
admit for urosepsis , and get ID and gu eval , hydrate , iv abx ,   dnr and dni , d/w son   on 9/12/19 sepsis , ? urosepsis , iv abx , bacteremia , poor po intake , dehydration , iv fluids , tylenol ,    patient dnr dni , neuro and ID consults ,
admit for urosepsis , and get ID and gu eval , hydrate , iv abx ,   dnr and dni , d/w son   on 9/12/19 sepsis , ? urosepsis , iv abx , bacteremia , poor po intake , dehydration , iv fluids , tylenol ,    patient dnr dni , neuro and ID consults ,   on 9/16/19 get another speech eval , and on iv abx , for urosepsis , prognosis is poor , but since cmo may be we acn do pleasure feeding   on 9/17/19 patient with advance parkinsons , , urosepsis , and pneumonia on cxr , on zosyn , , now with vesicular rash on face , possible zoster placed on acyclovir , on iv fluids , and has chronic foly ,   over all prognosis is poor and patient being evaluated by hospice , he is dnr and dni ,
admit for urosepsis , and get ID and gu eval , hydrate , iv abx ,   dnr and dni , d/w son   on 9/12/19 sepsis , ? urosepsis , iv abx , bacteremia , poor po intake , dehydration , iv fluids , tylenol ,    patient dnr dni , neuro and ID consults ,   on 9/16/19 get another speech eval , and on iv abx , for urosepsis , prognosis is poor , but since cmo may be we acn do pleasure feeding   on 9/17/19 patient with advance parkinsons , , urosepsis , and pneumonia on cxr , on zosyn , , now with vesicular rash on face , possible zoster placed on acyclovir , on iv fluids , and has chronic foly ,   over all prognosis is poor and patient being evaluated by hospice , he is dnr and dni ,   on 9/18/19 case d/w son , and staff agree to CMO , stop abx , and keep patient comfortable and peaceful, and pain free and with hospice at the facility ,    plan dc to  rehab , LTC with hospice in am
admit for urosepsis , and get ID and gu eval , hydrate , iv abx ,   dnr and dni , d/w son   on 9/12/19 sepsis , ? urosepsis , iv abx , bacteremia , poor po intake , dehydration , iv fluids , tylenol ,    patient dnr dni , neuro and ID consults ,   on 9/16/19 get another speech eval , and on iv abx , for urosepsis , prognosis is poor , but since cmo may be we acn do pleasure feeding   on 9/17/19 patient with advance parkinsons , , urosepsis , and pneumonia on cxr , on zosyn , , now with vesicular rash on face , possible zoster placed on acyclovir , on iv fluids , and has chronic foly ,   over all prognosis is poor and patient being evaluated by hospice , he is dnr and dni ,   on 9/18/19 case d/w son , and staff agree to CMO , stop abx , and keep patient comfortable and peaceful, and pain free and with hospice at the facility ,    plan dc to  rehab , LTC with hospice in am   on 9/19/19 patient in bed , non verbal , HZV scabs around mouth and face , seems comfortable overall , on MS sublingual prn , case d/w son and dtr and plan for CMO and hospice care and thus will dc to rehab ith hospice , po feeds with pleasure feeds
urosepsis dehydration   parkinsons
urosepsis dehydration anemia hypokalemia parkinsons
admit for urosepsis , and get ID and gu eval , hydrate , iv abx ,   dnr and dni , d/w son   on 9/12/19 sepsis , ? urosepsis , iv abx , bacteremia , poor po intake , dehydration , iv fluids , tylenol ,    patient dnr dni , neuro and ID consults ,   on 9/16/19 get another speech eval , and on iv abx , for urosepsis , prognosis is poor , but since cmo may be we acn do pleasure feeding

## 2019-09-19 NOTE — PROGRESS NOTE ADULT - SUBJECTIVE AND OBJECTIVE BOX
PCP  Subjective:   in bed , family at bed side , awake , non verbal     Objective:   Vital Signs Last 24 Hrs  T(C): 36.6 (19 @ 07:56), Max: 37.3 (19 @ 23:40)  T(F): 97.8 (19 @ 07:56), Max: 99.2 (19 @ 23:40)  HR: 66 (19 @ 07:56) (63 - 86)  BP: 158/73 (19 @ 07:56) (143/75 - 161/75)  BP(mean): --  RR: 18 (19 @ 07:56) (18 - 18)  SpO2: 99% (19 @ 07:56) (96% - 99%)  Daily     Daily Weight in k.2 (19 Sep 2019 04:40)     GENERAL:  wdwn male , awake , non verbal , has a cough  EYES: open   NECK: general stiffness, on right face and around mouth there is a scabs around mouth with some scabbing , and redness ,   CHEST/LUNG: clear  HEART: s1 s2 regular  ABDOMEN:  soft  EXTREMITIES:  no edema   SKIN:  warm   CNS:  advance parkinson's , stiffness, non verbal      Allergies: Allergies    No Known Allergies    Intolerances        Home Medications:  morphine 10 mg/5 mL oral solution: 1 milliliter(s) orally every 2 hours, As needed, Mild Pain (1 - 3) (18 Sep 2019 20:01)    Medications:   acetaminophen  Suppository .. 650 milliGRAM(s) Rectal every 6 hours PRN  carbidopa/levodopa  25/100 1 Tablet(s) Oral three times a day  enoxaparin Injectable 40 milliGRAM(s) SubCutaneous daily  morphine   Solution 2 milliGRAM(s) Oral every 2 hours PRN      LABS:                  CAPILLARY BLOOD GLUCOSE              RECENT CULTURES:  Culture Results:   No growth to date. ( @ 19:00)  Culture Results:   No growth to date. ( @ 19:)        Culture - Blood (collected 19 @ 19:00)  Source: .Blood Blood-Peripheral  Preliminary Report (09-15-19 @ 19:01):    No growth to date.    Culture - Blood (collected 19 @ 19:00)  Source: .Blood Blood  Preliminary Report (09-15-19 @ 19:01):    No growth to date.

## 2019-09-19 NOTE — PROGRESS NOTE ADULT - PROBLEM SELECTOR PLAN 1
continue  antibiotics folllow labs
Continue IV antibiotics  F/U repeat cx data  Presuming upper tract involvement plan for more prolonged treatment, with ultimate choice of agent to be determined  Especially as NPO, continue IV Abx
cont  zosyn have stopped Vanco
iv abx , id consult
Continue IV antibiotics  F/U repeat cx data  Presuming upper tract involvement plan for more prolonges treatment, with ultimate choice of agent to be determined  Especially as NPO, continue IV Abx
cont vancomycin and zosyn  follow vanc level  10-15  will tailor once sensitivities back  surveillance cx ordered  wbc now elevated
continue  antibiotics folllow labs
continue  iv  antibiotics
iv abx , id consult
with decision to move to comfort care, no further abx or antivirals  Thank you for consulting us and involving us in the management of this most interesting and challenging case.     Please Call with any further questions

## 2019-09-19 NOTE — PROGRESS NOTE ADULT - PROBLEM SELECTOR PROBLEM 2
Dementia due to Parkinson's disease with behavioral disturbance
Parkinson disease
Benign prostatic hyperplasia with lower urinary tract symptoms, symptom details unspecified
Parkinson disease
Benign prostatic hyperplasia with lower urinary tract symptoms, symptom details unspecified
Dementia due to Parkinson's disease with behavioral disturbance
Parkinson disease
Parkinson disease

## 2019-09-19 NOTE — PROGRESS NOTE ADULT - PROBLEM SELECTOR PROBLEM 3
Parkinson disease
Herpes simplex type 1 infection
Dementia due to Parkinson's disease with behavioral disturbance
Rash
Benign prostatic hyperplasia with lower urinary tract symptoms, symptom details unspecified
Dementia due to Parkinson's disease with behavioral disturbance
Herpes simplex type 1 infection
Parkinson disease

## 2019-09-19 NOTE — PROGRESS NOTE ADULT - SUBJECTIVE AND OBJECTIVE BOX
Neurology follow up note    KAITLIN [id number]      Interval History:    Patient resting in bed       MEDICATIONS    acetaminophen  Suppository .. 650 milliGRAM(s) Rectal every 6 hours PRN  carbidopa/levodopa  25/100 1 Tablet(s) Oral three times a day  enoxaparin Injectable 40 milliGRAM(s) SubCutaneous daily  morphine   Solution 2 milliGRAM(s) Oral every 2 hours PRN      Allergies    No Known Allergies    Intolerances            Vital Signs Last 24 Hrs  T(C): 36.6 (19 Sep 2019 07:56), Max: 37.3 (18 Sep 2019 23:40)  T(F): 97.8 (19 Sep 2019 07:56), Max: 99.2 (18 Sep 2019 23:40)  HR: 66 (19 Sep 2019 07:56) (63 - 86)  BP: 158/73 (19 Sep 2019 07:56) (143/75 - 161/75)  BP(mean): --  RR: 18 (19 Sep 2019 07:56) (18 - 18)  SpO2: 99% (19 Sep 2019 07:56) (96% - 99%)        REVIEW OF SYSTEMS: Non Verbal  Limited or unable to obtain secondary to patient's poor mental status.    NEUROLOGIC:  The patient was resting in bed     The patient will not follow any commands.   The patient has positive masked face.    The patient is nonverbal.    Motor:  slight movement  Positive cogwheel rigidity was noted.        GENERAL Exam: Nontoxic , No Acute Distress   	  HEENT:   face mouth lesion suspect HSV  		  LUNGS:  Decreased bilaterally  	  HEART: Normal S1S2   No murmur RRR        	  GI/ ABDOMEN:  Soft  Non tender    EXTREMITIES:   No Edema  No Clubbing  No Cyanosis   	   SKIN: Normal  No Ecchymosis              LABS:            Hemoglobin A1C:       Vitamin B12         RADIOLOGY      ANALYSIS AND PLAN:  A 78-year-old with history of dementia and Parkinson's.  1.	hospice which is reasonable  pain medications  2.	will sign off   3.	Greater than 20 minutes of time was spent on the patient, plan of care, reviewing data, speaking to family and multidisciplinary healthcare team.    Thank you for the courtesy of consultation.

## 2019-09-19 NOTE — PROGRESS NOTE ADULT - PROBLEM SELECTOR PROBLEM 5
Parkinson disease

## 2019-09-19 NOTE — PROGRESS NOTE ADULT - PROBLEM SELECTOR PLAN 2
continue meds
management per primary team  Aspiration/Swallowing eval as per medicine.
has chronic foly
management per primary team
continue meds
has chronic foly
management per primary team
management per primary team
supportive  care   outlook poor

## 2019-09-19 NOTE — PROGRESS NOTE ADULT - PROVIDER SPECIALTY LIST ADULT
Infectious Disease
Internal Medicine
Neurology
Urology
Infectious Disease
Infectious Disease
Internal Medicine
Internal Medicine

## 2019-09-19 NOTE — PROGRESS NOTE ADULT - PROBLEM SELECTOR PLAN 4
continue edmondson
monitor
continue edmondson
iv  kcl recheck labs
monitor

## 2019-09-19 NOTE — PROGRESS NOTE ADULT - SUBJECTIVE AND OBJECTIVE BOX
infectious diseases progress note:    KAITLIN LUX is a 78y y. o. Male patient    Patient with no concerning overnight events    Allergies    No Known Allergies    Intolerances        ANTIBIOTICS/RELEVANT:  antimicrobials    immunologic:    OTHER:  acetaminophen  Suppository .. 650 milliGRAM(s) Rectal every 6 hours PRN  carbidopa/levodopa  25/100 1 Tablet(s) Oral three times a day  enoxaparin Injectable 40 milliGRAM(s) SubCutaneous daily  morphine   Solution 2 milliGRAM(s) Oral every 2 hours PRN      Objective:  Vital Signs Last 24 Hrs  T(C): 36.6 (19 Sep 2019 07:56), Max: 37.3 (18 Sep 2019 23:40)  T(F): 97.8 (19 Sep 2019 07:56), Max: 99.2 (18 Sep 2019 23:40)  HR: 66 (19 Sep 2019 07:56) (63 - 86)  BP: 158/73 (19 Sep 2019 07:56) (143/75 - 161/75)  BP(mean): --  RR: 18 (19 Sep 2019 07:56) (18 - 18)  SpO2: 99% (19 Sep 2019 07:56) (96% - 99%)    T(C): 36.6 (09-19-19 @ 07:56), Max: 37.3 (09-18-19 @ 23:40)  T(C): 36.6 (09-19-19 @ 07:56), Max: 37.3 (09-18-19 @ 23:40)  T(C): 36.6 (09-19-19 @ 07:56), Max: 37.7 (09-16-19 @ 00:18)    PHYSICAL EXAM:  Constitutional: Well-developed, well nourished  Eyes: PERRLA, EOMI  Ear/Nose/Throat: oropharynx normal	  Neck: no JVD, no lymphadenopathy, supple  Respiratory: no accessory muscle use  Cardiovascular: RRR,   Gastrointestinal: soft, NT  Extremities: no clubbing, no cyanosis, edema absent  Skin: rash on face crusted      LABS:      9.20 09-16 @ 07:46  11.87 09-15 @ 09:06  17.22 09-14 @ 07:50  23.99 09-13 @ 07:42          Creatinine, Serum: 1.20 mg/dL (09-16-19 @ 07:47)  Creatinine, Serum: 0.76 mg/dL (09-15-19 @ 09:06)  Creatinine, Serum: 0.66 mg/dL (09-14-19 @ 07:50)  Creatinine, Serum: 0.93 mg/dL (09-13-19 @ 07:42)                MICROBIOLOGY:              RADIOLOGY & ADDITIONAL STUDIES:

## 2019-09-19 NOTE — PROGRESS NOTE ADULT - PROBLEM SELECTOR PROBLEM 4
Benign prostatic hyperplasia with lower urinary tract symptoms, symptom details unspecified
Hypertension, unspecified type
Benign prostatic hyperplasia with lower urinary tract symptoms, symptom details unspecified
Hypertension, unspecified type
Hypokalemia

## 2019-09-19 NOTE — DISCHARGE NOTE NURSING/CASE MANAGEMENT/SOCIAL WORK - PATIENT PORTAL LINK FT
You can access the FollowMyHealth Patient Portal offered by [facility] by registering at the following website: [email or web address]. By joining [employer]’s FollowMyHealth portal, you will also be able to view your health information using other applications (apps) compatible with our system.

## 2019-09-19 NOTE — PROGRESS NOTE ADULT - REASON FOR ADMISSION
fever, hematuria

## 2019-09-19 NOTE — PROGRESS NOTE ADULT - PROBLEM SELECTOR PLAN 3
continue meds
Continue Acyclovir  Brief course  Standard precautions
add sinemet , neuro eval
concern for HSV so have started therapy, ordered diagnostic testing and instituted contact precautions
Continue Acyclovir  Brief course  Standard precautions
add sinemet , neuro eval
continue meds

## 2019-10-01 PROCEDURE — 80202 ASSAY OF VANCOMYCIN: CPT

## 2019-10-01 PROCEDURE — 96367 TX/PROPH/DG ADDL SEQ IV INF: CPT

## 2019-10-01 PROCEDURE — 80053 COMPREHEN METABOLIC PANEL: CPT

## 2019-10-01 PROCEDURE — 85610 PROTHROMBIN TIME: CPT

## 2019-10-01 PROCEDURE — 94760 N-INVAS EAR/PLS OXIMETRY 1: CPT

## 2019-10-01 PROCEDURE — 87186 SC STD MICRODIL/AGAR DIL: CPT

## 2019-10-01 PROCEDURE — 87150 DNA/RNA AMPLIFIED PROBE: CPT

## 2019-10-01 PROCEDURE — 71045 X-RAY EXAM CHEST 1 VIEW: CPT

## 2019-10-01 PROCEDURE — 83605 ASSAY OF LACTIC ACID: CPT

## 2019-10-01 PROCEDURE — 96365 THER/PROPH/DIAG IV INF INIT: CPT

## 2019-10-01 PROCEDURE — 94640 AIRWAY INHALATION TREATMENT: CPT

## 2019-10-01 PROCEDURE — 81001 URINALYSIS AUTO W/SCOPE: CPT

## 2019-10-01 PROCEDURE — 85730 THROMBOPLASTIN TIME PARTIAL: CPT

## 2019-10-01 PROCEDURE — 87040 BLOOD CULTURE FOR BACTERIA: CPT

## 2019-10-01 PROCEDURE — 80048 BASIC METABOLIC PNL TOTAL CA: CPT

## 2019-10-01 PROCEDURE — 85027 COMPLETE CBC AUTOMATED: CPT

## 2019-10-01 PROCEDURE — 93005 ELECTROCARDIOGRAM TRACING: CPT

## 2019-10-01 PROCEDURE — 71250 CT THORAX DX C-: CPT

## 2019-10-01 PROCEDURE — 74176 CT ABD & PELVIS W/O CONTRAST: CPT

## 2019-10-01 PROCEDURE — 36415 COLL VENOUS BLD VENIPUNCTURE: CPT

## 2019-10-01 PROCEDURE — 83735 ASSAY OF MAGNESIUM: CPT

## 2019-10-01 PROCEDURE — 87086 URINE CULTURE/COLONY COUNT: CPT

## 2019-10-01 PROCEDURE — 70450 CT HEAD/BRAIN W/O DYE: CPT

## 2019-10-01 PROCEDURE — 99221 1ST HOSP IP/OBS SF/LOW 40: CPT

## 2019-10-01 PROCEDURE — 87798 DETECT AGENT NOS DNA AMP: CPT

## 2019-10-01 PROCEDURE — 87529 HSV DNA AMP PROBE: CPT

## 2019-10-01 PROCEDURE — 96361 HYDRATE IV INFUSION ADD-ON: CPT

## 2019-10-01 PROCEDURE — 99291 CRITICAL CARE FIRST HOUR: CPT | Mod: 25

## 2019-10-01 PROCEDURE — 97161 PT EVAL LOW COMPLEX 20 MIN: CPT

## 2020-10-05 NOTE — ED ADULT NURSE REASSESSMENT NOTE - NS ED NURSE REASSESS COMMENT FT1
2000; Dr. Curiel at the bedside aware pt's rectal temp = 106.6. Pt is placed on the hypothermia blanket. Admitting provider bedside.

## 2021-06-01 NOTE — ED ADULT NURSE NOTE - NS ED NOTE  TALK SOMEONE YN
"Chief Complaint:   Chief Complaint   Patient presents with   â¢ Hand Pain     hand pain joint   â¢ Arm     shoulder pain and arm pain while lifting x one week        Pt is a 68yo F here for L sided shoulder pain after fall last week, neck tension, and R 4th and 5th digit pain. The pain in her fingers started this morning. She feels the pain mostly when flexing her digits. Her shoulder pain is mostly on the left side and hurts when she tries to brush her hair. She has a history of osteoarthritis In bilateral knees. She is not on any medication for it. She has tried only tylenol for the pain with some relief. She denies fevers, chills, incontinence. PMH:    Past Medical History:   Diagnosis Date   â¢ Congestive cardiac failure (CMS/HCC)    â¢ Coronary artery disease    â¢ Essential (primary) hypertension    â¢ Thyroid disease         ROS:  Gen: no fevers, no chills  Chest: no SOB, no chest pain  GI: no vomiting, no diarrhea, no stomach pain  : no dysuria, no pyuria    Physical Exam:  Vitals:  Visit Vitals  BP (!) 156/78   Pulse 93   Temp 98.8 Â°F (37.1 Â°C)   Resp 20   Ht 5' 6"" (1.676 m)   Wt 81.9 kg (180 lb 10.7 oz)   BMI 29.16 kg/mÂ²        General: NAD, sitting up in room, breathing comfortably  HEENT: head atraumatic, PERRLA, MMM, TMs white and pearly, no cervical lymphadenopathy  Heart: RRR, could not appreciate any murmurs  Lungs: CTA in all lung fields bilaterally, no crackles or wheezes  Abdomen: no tenderness in all 4 quadrants  : no CVA tenderness b/l  MSK: 1+ edema in b/l LEs with no pitting, able to abduct shoulder b/; fully and full strength in b/l shoulders, full  strength b/l, negative spurling test b/l, some tenderness along trapezius mm  Neuro: A&Ox3    A shoulder and cervical spine X-Ray was ordered. My reading of this film is no acute fracture but likely osteoarthritic changes in the spine.  (No comparison films available: pending review by Radiologist.)      Assessment and Plan:  68yo F with " shoulder pain and hand joint pain. Possible diagnoses include cervical radiculopathy, muscle tension in shoulders, an ulnar neuropathy in the hand, or arthritic changes in the neck causing discomfort. She may also have PMR given acute onset and inability to brush hair. I suggested she see PCP for PMR labs if pain, weakness, and discomfort continue. We scheduled an appt with her PCP for her and she will f/u there. ED Diagnosis   1. Right arm pain  CANCELED: XR NECK SOFT TISSUE 2 VIEWS   2. Acute pain of left shoulder  XR SHOULDER 3 VIEWS LEFT    CANCELED: XR SHOULDER 2 VIEWS LEFT       Cervical radiculopathy:  - Use steroids for 5 days to reduce inflammation  - Take tylenol every 6-8 hours for pain  - See PCP for possible physical therapy  - See PCP for labs for chronic inflammatory issues     I discussed this plan of care with the patient and they expressed understanding of all treatments to me. All questions were answered in the room with the patient. They were advised to present to the ER if symptoms persist or worsen and expressed understanding of that.        Jorge Luis Camejo Dr No

## 2022-08-03 NOTE — SWALLOW BEDSIDE ASSESSMENT ADULT - SWALLOW EVAL: CRITERIA FOR SKILLED INTERVENTION MET
demonstrates skilled criteria for swallowing intervention
not appropriate for swallowing intervention
No

## 2023-07-22 NOTE — INPATIENT CERTIFICATION FOR MEDICARE PATIENTS - PHYSICIAN CONCUR
PAST SURGICAL HISTORY:  No significant past surgical history I concur with the Admission Order and I certify that services are provided in accordance with Section 42 CFR § 412.3